# Patient Record
Sex: FEMALE | Race: WHITE | Employment: FULL TIME | URBAN - NONMETROPOLITAN AREA
[De-identification: names, ages, dates, MRNs, and addresses within clinical notes are randomized per-mention and may not be internally consistent; named-entity substitution may affect disease eponyms.]

---
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
## 2019-01-02 NOTE — PROGRESS NOTES
SUBJECTIVE:   Mayra Del Cid is a 18 year old female who presents to clinic today for the following health issues:        Depression and Anxiety Follow-Up    Status since last visit: Improved     Other associated symptoms:None    Complicating factors:     Significant life event: No     Current substance abuse: None    PHQ 2018   PHQ-9 Total Score 16 18 15   Q9: Suicide Ideation Not at all Several days Not at all   F/U: Thoughts of suicide or self-harm - No -   F/U: Safety concerns - No -     VIPUL-7 SCORE 2018   Total Score 15 18 17       PHQ-9  English  PHQ-9   Any Language  VIPUL-7  Suicide Assessment Five-step Evaluation and Treatment (SAFE-T)    Amount of exercise or physical activity: 6-7 days/week for an average of 15-30 minutes    Problems taking medications regularly: No    Medication side effects: none    Diet: regular (no restrictions)            Problem list and histories reviewed & adjusted, as indicated.  Additional history: as documented    Patient Active Problem List   Diagnosis     Generalized headache     Pain in joint, shoulder region     Somatic dysfunction of cervical region     Disorder of bursae and tendons in shoulder region     Sprain and strain of hip and thigh     Pain in joint, pelvic region and thigh     Enthesopathy of hip region     ACP (advance care planning)     Moderate single current episode of major depressive disorder (H)     VIPUL (generalized anxiety disorder)     Past Surgical History:   Procedure Laterality Date     ORTHOPEDIC SURGERY      right labrum surgery        Social History     Tobacco Use     Smoking status: Former Smoker     Packs/day: 1.00     Years: 3.00     Pack years: 3.00     Types: Cigarettes     Start date: 2016     Last attempt to quit: 10/17/2018     Years since quittin.2     Smokeless tobacco: Never Used   Substance Use Topics     Alcohol use: Yes     Comment: occ     Family History   Problem Relation  Age of Onset     Hypertension Mother      Depression Mother      Anxiety Disorder Mother      Asthma Mother      Depression Father      Substance Abuse Father      Diabetes Maternal Grandmother      Heart Disease Maternal Grandmother      Thyroid Disease Maternal Grandmother      Depression Maternal Grandmother      Anxiety Disorder Maternal Grandmother      Asthma Maternal Grandmother      Diabetes Maternal Grandfather      Heart Disease Maternal Grandfather      Chronic Obstructive Pulmonary Disease Paternal Grandmother          Current Outpatient Medications   Medication Sig Dispense Refill     albuterol (PROAIR HFA, PROVENTIL HFA, VENTOLIN HFA) 108 (90 BASE) MCG/ACT inhaler Inhale 2 puffs into the lungs       etonogestrel (IMPLANON/NEXPLANON) 68 MG IMPL 1 each (68 mg) by Subdermal route continuous       IBUPROFEN PO Take 600 mg by mouth every 8 hours as needed for moderate pain       LORazepam (ATIVAN) 0.5 MG tablet Take 1 tablet (0.5 mg) by mouth every 6 hours as needed for anxiety 15 tablet 0     sertraline (ZOLOFT) 50 MG tablet Take 1 tablet (50 mg) by mouth daily 90 tablet 3     No Known Allergies  Recent Labs   Lab Test 07/17/17  1713 07/27/16  0900   TSH 0.91 1.13      BP Readings from Last 3 Encounters:   01/04/19 104/72   12/21/18 116/70 (61 %/ 66 %)*   11/02/18 128/80 (93 %/ 93 %)*     *BP percentiles are based on the August 2017 AAP Clinical Practice Guideline for girls    Wt Readings from Last 3 Encounters:   01/04/19 114.3 kg (252 lb) (>99 %)*   12/21/18 116.6 kg (257 lb) (>99 %)*   11/02/18 106.6 kg (235 lb) (>99 %)*     * Growth percentiles are based on CDC (Girls, 2-20 Years) data.                    Reviewed and updated as needed this visit by clinical staff       Reviewed and updated as needed this visit by Provider         ROS:  Constitutional, neuro, ENT, endocrine, pulmonary, cardiac, gastrointestinal, genitourinary, musculoskeletal, integument and psychiatric systems are negative, except as  otherwise noted.    OBJECTIVE:                                                    /72   Pulse 79   Temp 97.7  F (36.5  C) (Tympanic)   Wt 114.3 kg (252 lb)   SpO2 97%   BMI 39.47 kg/m    Body mass index is 39.47 kg/m .  GENERAL APPEARANCE: healthy, alert and no distress  NEURO: Normal strength and tone, mentation intact and speech normal  PSYCH: mentation appears normal and affect normal/bright    Diagnostic test results:  Diagnostic Test Results:  none      ASSESSMENT/PLAN:                                                    1. Screen for STD (sexually transmitted disease)  She is going to do a urine screening today. Low risk.   - GC/Chlamydia by PCR - [facility]    2. Major depressive disorder, recurrent episode, in partial remission (H)  She is doing much better. PHQ went from 15 to 5 and sleeping has improved.  The switch inially has been quite helpful. We will have her see us back in 3 month and if having any changes can my chart us to make changes.      3. Encounter for screening for HIV  Willing to complete. Very low risk patient.   - HIV Antigen Antibody Combo      See Patient Instructions    SANDER Grace  [facility] [place]

## 2019-01-04 ENCOUNTER — OFFICE VISIT (OUTPATIENT)
Dept: FAMILY MEDICINE | Facility: OTHER | Age: 19
End: 2019-01-04
Attending: PHYSICIAN ASSISTANT
Payer: COMMERCIAL

## 2019-01-04 VITALS
BODY MASS INDEX: 39.47 KG/M2 | DIASTOLIC BLOOD PRESSURE: 72 MMHG | WEIGHT: 252 LBS | SYSTOLIC BLOOD PRESSURE: 104 MMHG | OXYGEN SATURATION: 97 % | TEMPERATURE: 97.7 F | HEART RATE: 79 BPM

## 2019-01-04 DIAGNOSIS — Z11.3 SCREEN FOR STD (SEXUALLY TRANSMITTED DISEASE): Primary | ICD-10-CM

## 2019-01-04 DIAGNOSIS — F33.41 MAJOR DEPRESSIVE DISORDER, RECURRENT EPISODE, IN PARTIAL REMISSION (H): ICD-10-CM

## 2019-01-04 DIAGNOSIS — Z11.4 ENCOUNTER FOR SCREENING FOR HIV: ICD-10-CM

## 2019-01-04 LAB
C TRACH DNA SPEC QL PROBE+SIG AMP: NOT DETECTED
N GONORRHOEA DNA SPEC QL PROBE+SIG AMP: NOT DETECTED
SPECIMEN SOURCE: NORMAL

## 2019-01-04 PROCEDURE — 87389 HIV-1 AG W/HIV-1&-2 AB AG IA: CPT | Mod: 90 | Performed by: PHYSICIAN ASSISTANT

## 2019-01-04 PROCEDURE — 99213 OFFICE O/P EST LOW 20 MIN: CPT | Performed by: PHYSICIAN ASSISTANT

## 2019-01-04 PROCEDURE — 36415 COLL VENOUS BLD VENIPUNCTURE: CPT | Performed by: PHYSICIAN ASSISTANT

## 2019-01-04 PROCEDURE — 87491 CHLMYD TRACH DNA AMP PROBE: CPT | Performed by: PHYSICIAN ASSISTANT

## 2019-01-04 PROCEDURE — 87591 N.GONORRHOEAE DNA AMP PROB: CPT | Performed by: PHYSICIAN ASSISTANT

## 2019-01-04 PROCEDURE — 99000 SPECIMEN HANDLING OFFICE-LAB: CPT | Performed by: PHYSICIAN ASSISTANT

## 2019-01-04 ASSESSMENT — PAIN SCALES - GENERAL: PAINLEVEL: NO PAIN (0)

## 2019-01-04 ASSESSMENT — ANXIETY QUESTIONNAIRES
4. TROUBLE RELAXING: MORE THAN HALF THE DAYS
5. BEING SO RESTLESS THAT IT IS HARD TO SIT STILL: SEVERAL DAYS
6. BECOMING EASILY ANNOYED OR IRRITABLE: NEARLY EVERY DAY
2. NOT BEING ABLE TO STOP OR CONTROL WORRYING: SEVERAL DAYS
GAD7 TOTAL SCORE: 11
3. WORRYING TOO MUCH ABOUT DIFFERENT THINGS: SEVERAL DAYS
7. FEELING AFRAID AS IF SOMETHING AWFUL MIGHT HAPPEN: SEVERAL DAYS
1. FEELING NERVOUS, ANXIOUS, OR ON EDGE: MORE THAN HALF THE DAYS

## 2019-01-04 ASSESSMENT — PATIENT HEALTH QUESTIONNAIRE - PHQ9: SUM OF ALL RESPONSES TO PHQ QUESTIONS 1-9: 5

## 2019-01-04 NOTE — PATIENT INSTRUCTIONS
Thank you for choosing [facility].   I have office hours 8:00 am to 4:30 pm on Monday's, Wednesday's, Thursday's and Friday's. My nurse and I are out of the office every Tuesday.    Following your visit, when your labs and diagnostic testing have returned, I will review then and you will be contacted by my nurse.  If you are on My Chart, you can also view results there.    For refills, notify your pharmacy regarding what you need and the pharmacy will generate a refill request. Do not call my nurse as she is unable to process refill request. Please plan ahead and allow 3-5 days for refill requests.    You will generally receive a reminder call the day prior to your appointment.  If you cannot attend your appointment, please cancel your appointment with as much notice as possible.  If there is a pattern of failure to present for your appointments, I cannot provide consistent, meaningful, ongoing care for you. It is very important to me that you come in for your care, so we can best assist you with your health care needs.    IMPORTANT:  Please note that it is my standard of practice to NOT participate in prescribing ongoing requested Narcotic Analgesic therapy, and/or participate in the prescribing of other controlled substances.  My nurse and I am happy to assist you with the process of referral for alternative pain management as needed, and other treatment modalities including but not limited to:  Physical Therapy, Physical Medicine and Rehab, Counseling, Chiropractic Care, Orthopedic Care, and non-narcotic medication management.     In the event that you need to be seen for emergent concerns and I am out of office,  please see one of my colleagues for acute concerns.  You may also present to  or ER.  I appreciate the opportunity to serve you and look forward to supporting your healthcare needs in the future. Please contact me with any questions or concerns that you may  have.    Sincerely,      Ceci Bernal RN, PA-C

## 2019-01-04 NOTE — NURSING NOTE
"Chief Complaint   Patient presents with     Recheck Medication       Initial /72   Pulse 79   Temp 97.7  F (36.5  C) (Tympanic)   Wt 114.3 kg (252 lb)   SpO2 97%   BMI 39.47 kg/m   Estimated body mass index is 39.47 kg/m  as calculated from the following:    Height as of 11/2/18: 1.702 m (5' 7\").    Weight as of this encounter: 114.3 kg (252 lb).  Medication Reconciliation: complete    Nedra Redmond LPN  "

## 2019-01-05 ASSESSMENT — ANXIETY QUESTIONNAIRES: GAD7 TOTAL SCORE: 11

## 2019-01-07 LAB — HIV 1+2 AB+HIV1 P24 AG SERPL QL IA: NONREACTIVE

## 2019-01-15 ENCOUNTER — MYC MEDICAL ADVICE (OUTPATIENT)
Dept: FAMILY MEDICINE | Facility: OTHER | Age: 19
End: 2019-01-15

## 2019-01-15 DIAGNOSIS — F41.0 PANIC ATTACK: Primary | ICD-10-CM

## 2019-01-16 RX ORDER — HYDROXYZINE HYDROCHLORIDE 25 MG/1
25 TABLET, FILM COATED ORAL 3 TIMES DAILY PRN
Qty: 30 TABLET | Refills: 1 | Status: SHIPPED | OUTPATIENT
Start: 2019-01-16 | End: 2019-04-05

## 2019-03-22 ENCOUNTER — MYC MEDICAL ADVICE (OUTPATIENT)
Dept: FAMILY MEDICINE | Facility: OTHER | Age: 19
End: 2019-03-22

## 2019-03-22 DIAGNOSIS — Z30.430 ENCOUNTER FOR IUD INSERTION: Primary | ICD-10-CM

## 2019-03-22 DIAGNOSIS — F41.1 GAD (GENERALIZED ANXIETY DISORDER): ICD-10-CM

## 2019-03-29 NOTE — PROGRESS NOTES
SUBJECTIVE:   Mayra Del Cid is a 18 year old female who presents to clinic today for the following health issues:      Depression and Anxiety Follow-Up    Status since last visit: Depression improved, feels less sad. Anxiety has no change, still feel as though heart is beating out of chest    Other associated symptoms:None    Complicating factors:     Significant life event: No     Current substance abuse: None    PHQ 2018   PHQ-9 Total Score 18 15 5   Q9: Suicide Ideation Several days Not at all Not at all   F/U: Thoughts of suicide or self-harm No - -   F/U: Safety concerns No - -     VIPUL-7 SCORE 2018   Total Score 18 17 11       PHQ-9  English  PHQ-9   Any Language  VIPUL-7  Suicide Assessment Five-step Evaluation and Treatment (SAFE-T)    Amount of exercise or physical activity: 4-5 days/week for an average of 45-60 minutes    Problems taking medications regularly: No    Medication side effects: none    Diet: regular (no restrictions)            Problem list and histories reviewed & adjusted, as indicated.  Additional history: as documented    Patient Active Problem List   Diagnosis     Generalized headache     Pain in joint, shoulder region     Somatic dysfunction of cervical region     Disorder of bursae and tendons in shoulder region     Sprain and strain of hip and thigh     Pain in joint, pelvic region and thigh     Enthesopathy of hip region     ACP (advance care planning)     Moderate single current episode of major depressive disorder (H)     VIPUL (generalized anxiety disorder)     Past Surgical History:   Procedure Laterality Date     ORTHOPEDIC SURGERY      right labrum surgery        Social History     Tobacco Use     Smoking status: Former Smoker     Packs/day: 1.00     Years: 3.00     Pack years: 3.00     Types: Cigarettes     Start date: 2016     Last attempt to quit: 10/17/2018     Years since quittin.4     Smokeless tobacco: Never Used    Substance Use Topics     Alcohol use: Yes     Comment: occ     Family History   Problem Relation Age of Onset     Hypertension Mother      Depression Mother      Anxiety Disorder Mother      Asthma Mother      Depression Father      Substance Abuse Father      Diabetes Maternal Grandmother      Heart Disease Maternal Grandmother      Thyroid Disease Maternal Grandmother      Depression Maternal Grandmother      Anxiety Disorder Maternal Grandmother      Asthma Maternal Grandmother      Diabetes Maternal Grandfather      Heart Disease Maternal Grandfather      Chronic Obstructive Pulmonary Disease Paternal Grandmother          Current Outpatient Medications   Medication Sig Dispense Refill     albuterol (PROAIR HFA, PROVENTIL HFA, VENTOLIN HFA) 108 (90 BASE) MCG/ACT inhaler Inhale 2 puffs into the lungs       etonogestrel (IMPLANON/NEXPLANON) 68 MG IMPL 1 each (68 mg) by Subdermal route continuous       IBUPROFEN PO Take 600 mg by mouth every 8 hours as needed for moderate pain       LORazepam (ATIVAN) 0.5 MG tablet Take 1 tablet (0.5 mg) by mouth every 6 hours as needed for anxiety 15 tablet 0     metoprolol tartrate (LOPRESSOR) 25 MG tablet Take 1 tablet (25 mg) by mouth 2 times daily 60 tablet 1     sertraline (ZOLOFT) 100 MG tablet Take 1 tablet (100 mg) by mouth daily 90 tablet 1     sertraline (ZOLOFT) 50 MG tablet Take 1.5 tablets (75 mg) by mouth daily 135 tablet 3     No Known Allergies  Recent Labs   Lab Test 07/17/17  1713 07/27/16  0900   TSH 0.91 1.13      BP Readings from Last 3 Encounters:   04/05/19 120/84   01/04/19 104/72   12/21/18 116/70    Wt Readings from Last 3 Encounters:   04/05/19 117.9 kg (260 lb) (>99 %)*   01/04/19 114.3 kg (252 lb) (>99 %)*   12/21/18 116.6 kg (257 lb) (>99 %)*     * Growth percentiles are based on CDC (Girls, 2-20 Years) data.                    Reviewed and updated as needed this visit by clinical staff       Reviewed and updated as needed this visit by Provider      "    ROS:  Constitutional, HEENT, cardiovascular, pulmonary, gi and gu systems are negative, except as otherwise noted.    OBJECTIVE:                                                    /84   Pulse 80   Temp 98.6  F (37  C) (Tympanic)   Ht 1.702 m (5' 7\")   Wt 117.9 kg (260 lb)   SpO2 99%   BMI 40.72 kg/m    Body mass index is 40.72 kg/m .  GENERAL APPEARANCE: healthy, alert and no distress  CV: regular rates and rhythm, normal S1 S2, no S3 or S4 and no murmur, click or rub  SKIN: no suspicious lesions or rashes  PSYCH: mentation appears normal and affect normal/bright  MENTAL STATUS EXAM:  Appearance/Behavior: No apparent distress and Appears stated age  Speech: Normal  Mood/Affect: normal affect  Insight: Adequate    Diagnostic test results:  Diagnostic Test Results:  No results found for this or any previous visit (from the past 24 hour(s)).     ASSESSMENT/PLAN:                                                    1. VIPUL (generalized anxiety disorder)  She has more anxiety and her depression is much better.  Panic still an issue. Still has her Ativan. Saving it incase she needs this. She is always moving feels sedation of hte Zoloft but able to still function well. We will see her back in 4 weeks.  Has palpations and and this brings on panic so see if lopressor will help this.   - metoprolol tartrate (LOPRESSOR) 25 MG tablet; Take 1 tablet (25 mg) by mouth 2 times daily  Dispense: 60 tablet; Refill: 1  - sertraline (ZOLOFT) 100 MG tablet; Take 1 tablet (100 mg) by mouth daily  Dispense: 90 tablet; Refill: 1      See Patient Instructions    Ceci Bernal, PA  [facility] - [place]  "

## 2019-04-05 ENCOUNTER — OFFICE VISIT (OUTPATIENT)
Dept: OBGYN | Facility: OTHER | Age: 19
End: 2019-04-05
Attending: PHYSICIAN ASSISTANT
Payer: COMMERCIAL

## 2019-04-05 ENCOUNTER — OFFICE VISIT (OUTPATIENT)
Dept: FAMILY MEDICINE | Facility: OTHER | Age: 19
End: 2019-04-05
Attending: PHYSICIAN ASSISTANT
Payer: COMMERCIAL

## 2019-04-05 ENCOUNTER — TELEPHONE (OUTPATIENT)
Dept: FAMILY MEDICINE | Facility: OTHER | Age: 19
End: 2019-04-05

## 2019-04-05 VITALS
HEIGHT: 67 IN | HEART RATE: 89 BPM | SYSTOLIC BLOOD PRESSURE: 106 MMHG | OXYGEN SATURATION: 99 % | WEIGHT: 260 LBS | BODY MASS INDEX: 40.81 KG/M2 | DIASTOLIC BLOOD PRESSURE: 74 MMHG

## 2019-04-05 VITALS
BODY MASS INDEX: 40.81 KG/M2 | TEMPERATURE: 98.6 F | WEIGHT: 260 LBS | HEIGHT: 67 IN | DIASTOLIC BLOOD PRESSURE: 84 MMHG | OXYGEN SATURATION: 99 % | SYSTOLIC BLOOD PRESSURE: 120 MMHG | HEART RATE: 80 BPM

## 2019-04-05 DIAGNOSIS — Z11.3 SCREEN FOR STD (SEXUALLY TRANSMITTED DISEASE): Primary | ICD-10-CM

## 2019-04-05 DIAGNOSIS — Z30.09 ENCOUNTER FOR COUNSELING REGARDING CONTRACEPTION: ICD-10-CM

## 2019-04-05 DIAGNOSIS — F41.1 GAD (GENERALIZED ANXIETY DISORDER): ICD-10-CM

## 2019-04-05 LAB
SPECIMEN SOURCE: NORMAL
WET PREP SPEC: NORMAL

## 2019-04-05 PROCEDURE — 87491 CHLMYD TRACH DNA AMP PROBE: CPT | Performed by: NURSE PRACTITIONER

## 2019-04-05 PROCEDURE — 99213 OFFICE O/P EST LOW 20 MIN: CPT | Performed by: NURSE PRACTITIONER

## 2019-04-05 PROCEDURE — 87210 SMEAR WET MOUNT SALINE/INK: CPT | Performed by: NURSE PRACTITIONER

## 2019-04-05 PROCEDURE — 99213 OFFICE O/P EST LOW 20 MIN: CPT | Performed by: PHYSICIAN ASSISTANT

## 2019-04-05 PROCEDURE — 87591 N.GONORRHOEAE DNA AMP PROB: CPT | Performed by: NURSE PRACTITIONER

## 2019-04-05 RX ORDER — SERTRALINE HYDROCHLORIDE 100 MG/1
100 TABLET, FILM COATED ORAL DAILY
Qty: 90 TABLET | Refills: 1 | Status: SHIPPED | OUTPATIENT
Start: 2019-04-05 | End: 2019-07-26

## 2019-04-05 RX ORDER — METOPROLOL TARTRATE 25 MG/1
25 TABLET, FILM COATED ORAL 2 TIMES DAILY
Qty: 60 TABLET | Refills: 1 | Status: SHIPPED | OUTPATIENT
Start: 2019-04-05 | End: 2019-05-06

## 2019-04-05 ASSESSMENT — PATIENT HEALTH QUESTIONNAIRE - PHQ9
SUM OF ALL RESPONSES TO PHQ QUESTIONS 1-9: 6
5. POOR APPETITE OR OVEREATING: NEARLY EVERY DAY

## 2019-04-05 ASSESSMENT — MIFFLIN-ST. JEOR
SCORE: 1986.98
SCORE: 1986.98

## 2019-04-05 ASSESSMENT — ANXIETY QUESTIONNAIRES
7. FEELING AFRAID AS IF SOMETHING AWFUL MIGHT HAPPEN: SEVERAL DAYS
3. WORRYING TOO MUCH ABOUT DIFFERENT THINGS: MORE THAN HALF THE DAYS
GAD7 TOTAL SCORE: 15
5. BEING SO RESTLESS THAT IT IS HARD TO SIT STILL: SEVERAL DAYS
2. NOT BEING ABLE TO STOP OR CONTROL WORRYING: MORE THAN HALF THE DAYS
1. FEELING NERVOUS, ANXIOUS, OR ON EDGE: NEARLY EVERY DAY
6. BECOMING EASILY ANNOYED OR IRRITABLE: NEARLY EVERY DAY

## 2019-04-05 ASSESSMENT — PAIN SCALES - GENERAL
PAINLEVEL: NO PAIN (0)
PAINLEVEL: NO PAIN (0)

## 2019-04-05 NOTE — PROGRESS NOTES
"[facility]                HPI   Mayra presents today to discuss possible placement of a Paragard IUD.  She currently has a Nexplanon in place and it is due to be replaced in about 6 months. She has not been having periods until last week when she had a moderate flow for 1 week.  We have discussed all contraceptive options and have discussed the IUD's at length.  She would like to proceed with screenings today and have an IUD placed and Nexplanon removal next week.              Medications:     Current Outpatient Medications Ordered in Epic   Medication     albuterol (PROAIR HFA, PROVENTIL HFA, VENTOLIN HFA) 108 (90 BASE) MCG/ACT inhaler     etonogestrel (IMPLANON/NEXPLANON) 68 MG IMPL     IBUPROFEN PO     LORazepam (ATIVAN) 0.5 MG tablet     metoprolol tartrate (LOPRESSOR) 25 MG tablet     sertraline (ZOLOFT) 100 MG tablet     No current Epic-ordered facility-administered medications on file.                 Allergies:   Patient has no known allergies.         Review of Systems:   The 5 point Review of Systems is negative other than noted in the HPI                     Physical Exam:   Blood pressure 106/74, pulse 89, height 1.702 m (5' 7\"), weight 117.9 kg (260 lb), SpO2 99 %, not currently breastfeeding.  Constitutional:   awake, alert, cooperative, no apparent distress, and appears stated age     Genitounirinary:   External Genitalia:  General appearance; normal  Vagina:  Discharge absent, Lesions absent  Cervix:  General appearance normal, Tenderness absent              Data:   pending           Assessment and Plan:   Contraceptive counseling - as above.  Placement scheduled.  STD screening - wet prep and G/C completed.      ANGEL Ulloa  4/5/2019  2:04 PM  "

## 2019-04-05 NOTE — TELEPHONE ENCOUNTER
[facility] called to confirm Zoloft dose.      below copied from Henrry's note  - sertraline (ZOLOFT) 100 MG tablet; Take 1 tablet (100 mg) by mouth daily  Dispense: 90 tablet; Refill: 1       Pharmacy updated.

## 2019-04-05 NOTE — PATIENT INSTRUCTIONS
Thank you for choosing [facility].   I have office hours 8:00 am to 4:30 pm on Monday's, Wednesday's, Thursday's and Friday's. My nurse and I are out of the office every Tuesday.    Following your visit, when your labs and diagnostic testing have returned, I will review then and you will be contacted by my nurse.  If you are on My Chart, you can also view results there.    For refills, notify your pharmacy regarding what you need and the pharmacy will generate a refill request. Do not call my nurse as she is unable to process refill request. Please plan ahead and allow 3-5 days for refill requests.    You will generally receive a reminder call the day prior to your appointment.  If you cannot attend your appointment, please cancel your appointment with as much notice as possible.  If there is a pattern of failure to present for your appointments, I cannot provide consistent, meaningful, ongoing care for you. It is very important to me that you come in for your care, so we can best assist you with your health care needs.    IMPORTANT:  Please note that it is my standard of practice to NOT participate in prescribing ongoing requested Narcotic Analgesic therapy, and/or participate in the prescribing of other controlled substances.  My nurse and I am happy to assist you with the process of referral for alternative pain management as needed, and other treatment modalities including but not limited to:  Physical Therapy, Physical Medicine and Rehab, Counseling, Chiropractic Care, Orthopedic Care, and non-narcotic medication management.     In the event that you need to be seen for emergent concerns and I am out of office,  please see one of my colleagues for acute concerns.  You may also present to  or ER.  I appreciate the opportunity to serve you and look forward to supporting your healthcare needs in the future. Please contact me with any questions or concerns that you may  have.    Sincerely,      Ceci Bernal RN, PA-C

## 2019-04-05 NOTE — NURSING NOTE
"Chief Complaint   Patient presents with     IUD       Initial /74 (BP Location: Left arm, Patient Position: Sitting, Cuff Size: Adult Large)   Pulse 89   Ht 1.702 m (5' 7\")   Wt 117.9 kg (260 lb)   SpO2 99%   BMI 40.72 kg/m   Estimated body mass index is 40.72 kg/m  as calculated from the following:    Height as of this encounter: 1.702 m (5' 7\").    Weight as of this encounter: 117.9 kg (260 lb).  Medication Reconciliation: complete    Francia Becker LPN    "

## 2019-04-05 NOTE — NURSING NOTE
"No chief complaint on file.      Initial /84   Pulse 80   Temp 98.6  F (37  C) (Tympanic)   Ht 1.702 m (5' 7\")   Wt 117.9 kg (260 lb)   SpO2 99%   BMI 40.72 kg/m   Estimated body mass index is 40.72 kg/m  as calculated from the following:    Height as of this encounter: 1.702 m (5' 7\").    Weight as of this encounter: 117.9 kg (260 lb).  Medication Reconciliation: complete    Shruti ePrla LPN    "

## 2019-04-06 ASSESSMENT — ANXIETY QUESTIONNAIRES: GAD7 TOTAL SCORE: 15

## 2019-04-09 ENCOUNTER — MYC REFILL (OUTPATIENT)
Dept: FAMILY MEDICINE | Facility: OTHER | Age: 19
End: 2019-04-09

## 2019-04-09 DIAGNOSIS — F41.1 GAD (GENERALIZED ANXIETY DISORDER): ICD-10-CM

## 2019-04-09 NOTE — TELEPHONE ENCOUNTER
LORazepam (ATIVAN) 0.5 MG tablet      Last Written Prescription Date:  12/21/18  Last Fill Quantity: 15,   # refills: 0  Last Office Visit: 4/5/19  Future Office visit:    Next 5 appointments (look out 90 days)    Apr 11, 2019 11:15 AM CDT  (Arrive by 11:00 AM)  [facility]l [facility] [address] [address]  [phone]   May 06, 2019 10:20 AM CDT  (Arrive by 10:05 AM)  [facility] with SANDER Beltran  [facility] [address]  [facility] [address]  [phone]           Routing refill request to provider for review/approval because:  Drug not on the [facility], [facility] or  Health refill protocol or controlled substance    PCP Gill. Please advise. Thank you!

## 2019-04-10 RX ORDER — LORAZEPAM 0.5 MG/1
0.5 TABLET ORAL EVERY 6 HOURS PRN
Qty: 15 TABLET | Refills: 0 | Status: SHIPPED | OUTPATIENT
Start: 2019-04-10 | End: 2020-02-25

## 2019-04-11 ENCOUNTER — OFFICE VISIT (OUTPATIENT)
Dept: OBGYN | Facility: OTHER | Age: 19
End: 2019-04-11
Attending: NURSE PRACTITIONER
Payer: COMMERCIAL

## 2019-04-11 VITALS
HEART RATE: 76 BPM | OXYGEN SATURATION: 99 % | SYSTOLIC BLOOD PRESSURE: 108 MMHG | HEIGHT: 67 IN | BODY MASS INDEX: 40.81 KG/M2 | DIASTOLIC BLOOD PRESSURE: 72 MMHG | WEIGHT: 260 LBS

## 2019-04-11 DIAGNOSIS — Z30.46 ENCOUNTER FOR NEXPLANON REMOVAL: Primary | ICD-10-CM

## 2019-04-11 DIAGNOSIS — Z30.430 ENCOUNTER FOR IUD INSERTION: ICD-10-CM

## 2019-04-11 PROCEDURE — 11982 REMOVE DRUG IMPLANT DEVICE: CPT | Performed by: NURSE PRACTITIONER

## 2019-04-11 PROCEDURE — 58300 INSERT INTRAUTERINE DEVICE: CPT | Performed by: NURSE PRACTITIONER

## 2019-04-11 RX ORDER — COPPER 313.4 MG/1
1 INTRAUTERINE DEVICE INTRAUTERINE CONTINUOUS
Status: ACTIVE
Start: 2019-04-11

## 2019-04-11 ASSESSMENT — PAIN SCALES - GENERAL: PAINLEVEL: NO PAIN (0)

## 2019-04-11 ASSESSMENT — MIFFLIN-ST. JEOR: SCORE: 1986.98

## 2019-04-11 NOTE — PROGRESS NOTES
"CC:  IUD insertion and Nexplanon removal  HPI:  Mayra eDl Cid is a 18 year old female No LMP recorded. Patient has had an implant.  She is here for an IUD insertion. Patient has verbalized understanding of risks and benefits and has signed the consent form.    Past GYN history:  No STD history    Reviewed with patient in office    Allergies: Patient has no known allergies.    Current Outpatient Medications   Medication Sig Dispense Refill     albuterol (PROAIR HFA, PROVENTIL HFA, VENTOLIN HFA) 108 (90 BASE) MCG/ACT inhaler Inhale 2 puffs into the lungs       etonogestrel (IMPLANON/NEXPLANON) 68 MG IMPL 1 each (68 mg) by Subdermal route continuous       IBUPROFEN PO Take 600 mg by mouth every 8 hours as needed for moderate pain       LORazepam (ATIVAN) 0.5 MG tablet Take 1 tablet (0.5 mg) by mouth every 6 hours as needed for anxiety 15 tablet 0     metoprolol tartrate (LOPRESSOR) 25 MG tablet Take 1 tablet (25 mg) by mouth 2 times daily 60 tablet 1     sertraline (ZOLOFT) 100 MG tablet Take 1 tablet (100 mg) by mouth daily 90 tablet 1         ROS:  CONSTITUTIONAL:NEGATIVE for fever, chills, change in weight  : normal menstrual cycles, negative for and sexually transmitted disease      EXAM:  Blood pressure 108/72, pulse 76, height 1.702 m (5' 7\"), weight 117.9 kg (260 lb), SpO2 99 %, not currently breastfeeding.  General - pleasant female in no acute distress.  Pelvic - EG: normal adult female, BUS: within normal limits, Vagina: well rugated, no discharge, Cervix: no lesions or CMT, Uterus: firm, normal sized and nontender, Adnexae: no masses or tenderness.    PROCEDURE:  After informed consent was obtained from the patient, a speculum was placed in the vagina to visualized the cervix.  The cervix was then swabbed with a betadine prep.  Tenaculum was placed at the 12 o'clock position on the cervix and the uterus sounded to 8 cm.  The Paragard T-380  IUD was then placed in the usual fashion under sterile technique. "  Strings were clipped about 2-3 cm from the cervical os.  Tenaculum was removed and cervix was hemostatic. There were no complications. The patient tolerated the procedure well.    Procedure:  After obtaining consent from the patient the implanon was removed.  Her arm was prepped copiously with betadine and 1% lidocaine ~ 1 cc was injected into her arm over the implanon piter.  A 0.8 cm incision was made over the previous scar and the implanon piter was removed without difficulty. Pressure was placed over the incision and minimal bleeding was noted.  It was covered with 1/2 inch steri strips and the arm was bandaged with a kerlex guaze.  She will leave that on for at least 24 hrs.    She tolerated the procedure well.  No complications.     ASSESSMENT/PLAN:  (Z30.46) Encounter for Nexplanon removal  (primary encounter diagnosis)  Comment:   Plan: removed without incident.      (Z30.430) Encounter for IUD insertion  Comment:   Plan: INSERTION INTRAUTERINE DEVICE, paragard         intrauterine copper IUD device 1 each            Bleeding pattern of this particular IUD was discussed with the patient. She is aware that the IUD will need to be removed in 10 years or PRN.  She is to return to clinic for IUD check  In 6 weeks.    ANGEL Ulloa

## 2019-04-11 NOTE — NURSING NOTE
"Chief Complaint   Patient presents with     Contraception     Nexplanon Removal and IUD Placement        Initial /72 (BP Location: Right arm, Patient Position: Sitting, Cuff Size: Adult Large)   Pulse 76   Ht 1.702 m (5' 7\")   Wt 117.9 kg (260 lb)   SpO2 99%   BMI 40.72 kg/m   Estimated body mass index is 40.72 kg/m  as calculated from the following:    Height as of this encounter: 1.702 m (5' 7\").    Weight as of this encounter: 117.9 kg (260 lb).  Medication Reconciliation: complete    Francia Becker LPN    "

## 2019-05-03 NOTE — PROGRESS NOTES
SUBJECTIVE:   Mayra Del Cid is a 18 year old female who presents to clinic today for the following   health issues:      Depression and Anxiety Follow-Up    Status since last visit: depression is stable and anxiety has gotten better    Other associated symptoms:None    Complicating factors:     Significant life event: No     Current substance abuse: None    PHQ 1/4/2019 4/5/2019 5/6/2019   PHQ-9 Total Score 5 6 5   Q9: Thoughts of better off dead/self-harm past 2 weeks Not at all Not at all Not at all   F/U: Thoughts of suicide or self-harm - - -   F/U: Safety concerns - - -     VIPUL-7 SCORE 1/4/2019 4/5/2019 5/6/2019   Total Score 11 15 6       PHQ-9  English  PHQ-9   Any Language  VIPUL-7  Suicide Assessment Five-step Evaluation and Treatment (SAFE-T)    Amount of exercise or physical activity: 4-5 days/week for an average of 45-60 minutes    Problems taking medications regularly: No    Medication side effects: feels like she can not sleep and no sex drive     Diet: regular (no restrictions)            Additional history: as documented    Reviewed  and updated as needed this visit by clinical staff  Tobacco  Allergies  Meds  Med Hx  Surg Hx  Fam Hx  Soc Hx        Reviewed and updated as needed this visit by Provider         Patient Active Problem List   Diagnosis     Generalized headache     Pain in joint, shoulder region     Somatic dysfunction of cervical region     Disorder of bursae and tendons in shoulder region     Sprain and strain of hip and thigh     Pain in joint, pelvic region and thigh     Enthesopathy of hip region     ACP (advance care planning)     Moderate single current episode of major depressive disorder (H)     VIPUL (generalized anxiety disorder)     Past Surgical History:   Procedure Laterality Date     ORTHOPEDIC SURGERY      right labrum surgery        Social History     Tobacco Use     Smoking status: Former Smoker     Packs/day: 1.00     Years: 3.00     Pack years: 3.00     Types:  Cigarettes     Start date: 2016     Last attempt to quit: 10/17/2018     Years since quittin.5     Smokeless tobacco: Never Used   Substance Use Topics     Alcohol use: Yes     Comment: occ     Family History   Problem Relation Age of Onset     Hypertension Mother      Depression Mother      Anxiety Disorder Mother      Asthma Mother      Depression Father      Substance Abuse Father      Diabetes Maternal Grandmother      Heart Disease Maternal Grandmother      Thyroid Disease Maternal Grandmother      Depression Maternal Grandmother      Anxiety Disorder Maternal Grandmother      Asthma Maternal Grandmother      Diabetes Maternal Grandfather      Heart Disease Maternal Grandfather      Chronic Obstructive Pulmonary Disease Paternal Grandmother          Current Outpatient Medications   Medication Sig Dispense Refill     albuterol (PROAIR HFA, PROVENTIL HFA, VENTOLIN HFA) 108 (90 BASE) MCG/ACT inhaler Inhale 2 puffs into the lungs       buPROPion (WELLBUTRIN XL) 300 MG 24 hr tablet Take 1 tablet (300 mg) by mouth every morning 90 tablet 1     IBUPROFEN PO Take 600 mg by mouth every 8 hours as needed for moderate pain       LORazepam (ATIVAN) 0.5 MG tablet Take 1 tablet (0.5 mg) by mouth every 6 hours as needed for anxiety 15 tablet 0     metoprolol tartrate (LOPRESSOR) 25 MG tablet Take 1 tablet (25 mg) by mouth 2 times daily 180 tablet 1     sertraline (ZOLOFT) 100 MG tablet Take 1 tablet (100 mg) by mouth daily 90 tablet 1     No Known Allergies  Recent Labs   Lab Test 17  1713 16  0900   TSH 0.91 1.13      BP Readings from Last 3 Encounters:   19 122/70   19 108/72   19 106/74    Wt Readings from Last 3 Encounters:   19 121.6 kg (268 lb) (>99 %)*   19 117.9 kg (260 lb) (>99 %)*   19 117.9 kg (260 lb) (>99 %)*     * Growth percentiles are based on CDC (Girls, 2-20 Years) data.                    ROS:  Constitutional, neuro, ENT, endocrine, pulmonary,  cardiac, gastrointestinal, genitourinary, musculoskeletal, integument and psychiatric systems are negative, except as otherwise noted.    OBJECTIVE:                                                    /70   Pulse 67   Temp 97.7  F (36.5  C) (Tympanic)   Wt 121.6 kg (268 lb)   SpO2 98%   BMI 41.97 kg/m    Body mass index is 41.97 kg/m .  GENERAL APPEARANCE: healthy, alert and no distress  EYES: Eyes grossly normal to inspection, PERRL and conjunctivae and sclerae normal  HENT: ear canals and TM's normal and nose and mouth without ulcers or lesions  NECK: no adenopathy, no asymmetry, masses, or scars and thyroid normal to palpation  RESP: lungs clear to auscultation - no rales, rhonchi or wheezes  CV: regular rates and rhythm, normal S1 S2, no S3 or S4 and no murmur, click or rub  SKIN: no suspicious lesions or rashes  NEURO: Normal strength and tone, mentation intact and speech normal  PSYCH: mentation appears normal, affect normal/bright.  Metoprolol  helps physical sx of anxiety with palpitations and increased heart rate. Headaches still present on and off but not as severe an more tolerable from stress.     Diagnostic test results:  Diagnostic Test Results:  none      ASSESSMENT/PLAN:                                                    1. VIPUL (generalized anxiety disorder)  She is going to be given Wellbutrin and see is this helps.  No energy as not sleeping and loss of sex drive with increase in dose. We frankie cut the zoloft back to 50 mg and then 25 mg and hold on that dose.   - buPROPion (WELLBUTRIN XL) 300 MG 24 hr tablet; Take 1 tablet (300 mg) by mouth every morning  Dispense: 90 tablet; Refill: 1  - metoprolol tartrate (LOPRESSOR) 25 MG tablet; Take 1 tablet (25 mg) by mouth 2 times daily  Dispense: 180 tablet; Refill: 1    2. Moderate single current episode of major depressive disorder (H)  Take early AM will help her energy. Sleep is not great.  Try Melatonin and or benadyl. Can also try a low dose  of ativan if hasn't slept for day.   - buPROPion (WELLBUTRIN XL) 300 MG 24 hr tablet; Take 1 tablet (300 mg) by mouth every morning  Dispense: 90 tablet; Refill: 1      See Patient Instructions    SANDER Grace  [facility] - [place]

## 2019-05-06 ENCOUNTER — OFFICE VISIT (OUTPATIENT)
Dept: FAMILY MEDICINE | Facility: OTHER | Age: 19
End: 2019-05-06
Attending: PHYSICIAN ASSISTANT
Payer: COMMERCIAL

## 2019-05-06 VITALS
WEIGHT: 268 LBS | BODY MASS INDEX: 41.97 KG/M2 | OXYGEN SATURATION: 98 % | HEART RATE: 67 BPM | DIASTOLIC BLOOD PRESSURE: 70 MMHG | TEMPERATURE: 97.7 F | SYSTOLIC BLOOD PRESSURE: 122 MMHG

## 2019-05-06 DIAGNOSIS — F32.1 MODERATE SINGLE CURRENT EPISODE OF MAJOR DEPRESSIVE DISORDER (H): ICD-10-CM

## 2019-05-06 DIAGNOSIS — F41.1 GAD (GENERALIZED ANXIETY DISORDER): Primary | ICD-10-CM

## 2019-05-06 PROCEDURE — 99213 OFFICE O/P EST LOW 20 MIN: CPT | Performed by: PHYSICIAN ASSISTANT

## 2019-05-06 RX ORDER — METOPROLOL TARTRATE 25 MG/1
25 TABLET, FILM COATED ORAL 2 TIMES DAILY
Qty: 180 TABLET | Refills: 1 | Status: SHIPPED | OUTPATIENT
Start: 2019-05-06 | End: 2019-10-28

## 2019-05-06 RX ORDER — BUPROPION HYDROCHLORIDE 300 MG/1
300 TABLET ORAL EVERY MORNING
Qty: 90 TABLET | Refills: 1 | Status: SHIPPED | OUTPATIENT
Start: 2019-05-06 | End: 2019-07-26

## 2019-05-06 ASSESSMENT — ANXIETY QUESTIONNAIRES
GAD7 TOTAL SCORE: 6
4. TROUBLE RELAXING: SEVERAL DAYS
3. WORRYING TOO MUCH ABOUT DIFFERENT THINGS: SEVERAL DAYS
1. FEELING NERVOUS, ANXIOUS, OR ON EDGE: SEVERAL DAYS
2. NOT BEING ABLE TO STOP OR CONTROL WORRYING: SEVERAL DAYS
6. BECOMING EASILY ANNOYED OR IRRITABLE: MORE THAN HALF THE DAYS
5. BEING SO RESTLESS THAT IT IS HARD TO SIT STILL: NOT AT ALL
7. FEELING AFRAID AS IF SOMETHING AWFUL MIGHT HAPPEN: NOT AT ALL

## 2019-05-06 ASSESSMENT — PATIENT HEALTH QUESTIONNAIRE - PHQ9: SUM OF ALL RESPONSES TO PHQ QUESTIONS 1-9: 5

## 2019-05-06 ASSESSMENT — PAIN SCALES - GENERAL: PAINLEVEL: NO PAIN (0)

## 2019-05-06 NOTE — NURSING NOTE
"Chief Complaint   Patient presents with     Depression     Anxiety       Initial /70   Pulse 67   Temp 97.7  F (36.5  C) (Tympanic)   Wt 121.6 kg (268 lb)   SpO2 98%   BMI 41.97 kg/m   Estimated body mass index is 41.97 kg/m  as calculated from the following:    Height as of 4/11/19: 1.702 m (5' 7\").    Weight as of this encounter: 121.6 kg (268 lb).  Medication Reconciliation: complete    Nedra Redmond LPN    "

## 2019-05-07 ASSESSMENT — ANXIETY QUESTIONNAIRES: GAD7 TOTAL SCORE: 6

## 2019-05-24 ENCOUNTER — OFFICE VISIT (OUTPATIENT)
Dept: OBGYN | Facility: OTHER | Age: 19
End: 2019-05-24
Attending: NURSE PRACTITIONER
Payer: COMMERCIAL

## 2019-05-24 VITALS
DIASTOLIC BLOOD PRESSURE: 74 MMHG | HEART RATE: 75 BPM | BODY MASS INDEX: 40.81 KG/M2 | SYSTOLIC BLOOD PRESSURE: 118 MMHG | OXYGEN SATURATION: 98 % | WEIGHT: 260 LBS | HEIGHT: 67 IN

## 2019-05-24 DIAGNOSIS — Z30.431 IUD CHECK UP: Primary | ICD-10-CM

## 2019-05-24 PROCEDURE — 99213 OFFICE O/P EST LOW 20 MIN: CPT | Performed by: NURSE PRACTITIONER

## 2019-05-24 ASSESSMENT — MIFFLIN-ST. JEOR: SCORE: 1981.98

## 2019-05-24 ASSESSMENT — PAIN SCALES - GENERAL: PAINLEVEL: NO PAIN (0)

## 2019-05-24 NOTE — PROGRESS NOTES
"[facility] [place]                HPI   Mayra presents for 6 week Paragard IUD check.  She is tolerating it well.  No abnormal bleeding or cramping.  Denies concerns.              Medications:     Current Outpatient Medications Ordered in Epic   Medication     albuterol (PROAIR HFA, PROVENTIL HFA, VENTOLIN HFA) 108 (90 BASE) MCG/ACT inhaler     buPROPion (WELLBUTRIN XL) 300 MG 24 hr tablet     IBUPROFEN PO     LORazepam (ATIVAN) 0.5 MG tablet     metoprolol tartrate (LOPRESSOR) 25 MG tablet     sertraline (ZOLOFT) 100 MG tablet     Current Facility-Administered Medications Ordered in Epic   Medication Dose Route Frequency Last Rate Last Dose     paragard intrauterine copper IUD device 1 each  1 each Intrauterine Continuous                    Allergies:   Patient has no known allergies.         Review of Systems:   The 5 point Review of Systems is negative other than noted in the HPI                     Physical Exam:   Blood pressure 118/74, pulse 75, height 1.702 m (5' 7\"), weight 117.9 kg (260 lb), last menstrual period 05/20/2019, SpO2 98 %, not currently breastfeeding.  Constitutional:   awake, alert, cooperative, no apparent distress, and appears stated age     Genitounirinary:   External Genitalia:  General appearance; normal, Lesions absent  Vagina:  Discharge absent, Lesions absent  Cervix:  General appearance normal, IUD strings visible             Assessment and Plan:   IUD check -  Tolerating well.  Follow up annually and as needed.     ANGEL Ulloa  5/24/2019  12:35 PM  "

## 2019-05-24 NOTE — NURSING NOTE
"Chief Complaint   Patient presents with     IUD     Check        Initial /74 (BP Location: Right arm, Patient Position: Sitting, Cuff Size: Adult Large)   Pulse 75   Ht 1.702 m (5' 7\")   Wt 117.9 kg (260 lb)   LMP 05/20/2019   SpO2 98%   BMI 40.72 kg/m   Estimated body mass index is 40.72 kg/m  as calculated from the following:    Height as of this encounter: 1.702 m (5' 7\").    Weight as of this encounter: 117.9 kg (260 lb).  Medication Reconciliation: complete    Francia Becker LPN    "

## 2019-05-24 NOTE — PATIENT INSTRUCTIONS
What Paragard Users May Expect    What to watch for right after Paragard is placed  Some women may experience uterine cramps, bleeding, and/or dizziness during and right after Paragard is placed. To help minimize the cramps, you may taken ibuprofen 600 mg with food prior to your appointment. These symptoms should improve over the next 24 hours.  Mild cramping may be present for a few days after your placement  As a follow up, you should check your strings in 4 weeks, or visit your clinic once in the first 4 to 12 weeks after Paragard is placed to make sure it is in the right position. After that, Paragard can be checked once a year as part of your routine exam.    Please use a back-up method (abstinence or condoms) for 5 days after placement.    Your periods may change  Some women may have heavier and longer periods with cramping for a while; some may have spotting between periods. These side effects usually lessen after a few months. However, if your menstrual flow is severe or prolonged, call your healthcare professional. You should also call your healthcare professional if you miss a period.    Paragard Strings  You may check your own Paragard strings by inserting a finger into the vagina and feeling the strings as they exit the cervix.  The strings will initially feel firm, but will soften over a few weeks.  After the strings have softened, you or your partner should not be able to feel the strings during intercourse.  If you can feel the IUD, see your healthcare provider to have the position confirmed.  You may continue to use tampons with the IUD in place.    Paragard does not protect against HIV or STDs.  Paragard does not prevent the formation of ovarian cysts.  Paragard does not typically reduce acne or cause weight gain or mood changes.    Please call [facility] at [phone] if you have questions or concerns.    For more information:  [email or web address]

## 2019-06-21 DIAGNOSIS — F34.1 DYSTHYMIA: Primary | ICD-10-CM

## 2019-06-21 RX ORDER — DESVENLAFAXINE 50 MG/1
50 TABLET, FILM COATED, EXTENDED RELEASE ORAL DAILY
Qty: 30 TABLET | Refills: 3 | Status: SHIPPED | OUTPATIENT
Start: 2019-06-21 | End: 2019-07-26

## 2019-07-18 NOTE — PROGRESS NOTES
Subjective     Mayra Del Cid is a 19 year old female who presents to clinic today for the following health issues:    HPI   Depression and Anxiety Follow-Up    How are you doing with your depression since your last visit? Improved     How are you doing with your anxiety since your last visit?  Improved markidly    Are you having other symptoms that might be associated with depression or anxiety? No    Have you had a significant life event? No     Do you have any concerns with your use of alcohol or other drugs? No    Social History     Tobacco Use     Smoking status: Former Smoker     Packs/day: 1.00     Years: 3.00     Pack years: 3.00     Types: Cigarettes     Start date: 2016     Last attempt to quit: 10/17/2018     Years since quittin.7     Smokeless tobacco: Never Used   Substance Use Topics     Alcohol use: Yes     Comment: occ     Drug use: No     PHQ 2019   PHQ-9 Total Score 5 14 10   Q9: Thoughts of better off dead/self-harm past 2 weeks Not at all Not at all Not at all   F/U: Thoughts of suicide or self-harm - - -   F/U: Safety concerns - - -     VIPUL-7 SCORE 2019   Total Score 15 6 8     No flowsheet data found.  No flowsheet data found.      Suicide Assessment Five-step Evaluation and Treatment (SAFE-T)    Amount of exercise or physical activity: 4-5 days/week for an average of 30-45 minutes    Problems taking medications regularly: No    Medication side effects: none    Diet: regular (no restrictions)          Patient Active Problem List   Diagnosis     Generalized headache     Pain in joint, shoulder region     Somatic dysfunction of cervical region     Disorder of bursae and tendons in shoulder region     Sprain and strain of hip and thigh     Pain in joint, pelvic region and thigh     Enthesopathy of hip region     ACP (advance care planning)     Moderate single current episode of major depressive disorder (H)     VIPUL (generalized anxiety  disorder)     Past Surgical History:   Procedure Laterality Date     ORTHOPEDIC SURGERY      right labrum surgery        Social History     Tobacco Use     Smoking status: Former Smoker     Packs/day: 1.00     Years: 3.00     Pack years: 3.00     Types: Cigarettes     Start date: 2016     Last attempt to quit: 10/17/2018     Years since quittin.7     Smokeless tobacco: Never Used   Substance Use Topics     Alcohol use: Yes     Comment: occ     Family History   Problem Relation Age of Onset     Hypertension Mother      Depression Mother      Anxiety Disorder Mother      Asthma Mother      Depression Father      Substance Abuse Father      Diabetes Maternal Grandmother      Heart Disease Maternal Grandmother      Thyroid Disease Maternal Grandmother      Depression Maternal Grandmother      Anxiety Disorder Maternal Grandmother      Asthma Maternal Grandmother      Diabetes Maternal Grandfather      Heart Disease Maternal Grandfather      Chronic Obstructive Pulmonary Disease Paternal Grandmother          Current Outpatient Medications   Medication Sig Dispense Refill     albuterol (PROAIR HFA, PROVENTIL HFA, VENTOLIN HFA) 108 (90 BASE) MCG/ACT inhaler Inhale 2 puffs into the lungs       desvenlafaxine (PRISTIQ) 50 MG 24 hr tablet Take 1 tablet (50 mg) by mouth daily 30 tablet 3     IBUPROFEN PO Take 600 mg by mouth every 8 hours as needed for moderate pain       LORazepam (ATIVAN) 0.5 MG tablet Take 1 tablet (0.5 mg) by mouth every 6 hours as needed for anxiety 15 tablet 0     metoprolol tartrate (LOPRESSOR) 25 MG tablet Take 1 tablet (25 mg) by mouth 2 times daily 180 tablet 1     No Known Allergies  Recent Labs   Lab Test 17  1713 16  0900   TSH 0.91 1.13      BP Readings from Last 3 Encounters:   19 116/76   19 118/74   19 122/70    Wt Readings from Last 3 Encounters:   19 119.7 kg (263 lb 12.8 oz)   19 117.9 kg (260 lb)   19 121.6 kg (268 lb) (>99 %)*     *  "Growth percentiles are based on CDC (Girls, 2-20 Years) data.                      Reviewed and updated as needed this visit by Provider         Review of Systems   ROS COMP: Constitutional, HEENT, cardiovascular, pulmonary, gi and gu systems are negative, except as otherwise noted.      Objective    /76 (BP Location: Right arm, Patient Position: Sitting, Cuff Size: Adult Large)   Pulse 66   Temp 97.9  F (36.6  C) (Tympanic)   Ht 1.702 m (5' 7\")   Wt 119.7 kg (263 lb 12.8 oz)   LMP  (LMP Unknown)   SpO2 97%   BMI 41.32 kg/m    Body mass index is 41.32 kg/m .  Physical Exam   GENERAL: healthy, alert and no distress  PSYCH: mentation appears normal, affect normal/bright  PSYCH: she finds the cloud is lifted and able to feel frederick.     Diagnostic Test Results:  Labs reviewed in Epic  No results found for this or any previous visit (from the past 24 hour(s)).        Assessment & Plan   1. Dysthymia  She is feeling better. On Pristiq was almost immediate after taper off the Zoloft.  She has been feeling energetic awake and happy. She is going to be seeing us back in 3 months. She will let us know if things are not helping in a few weeks. Not feeling any need for adding in dose adjustment at this time.   - desvenlafaxine (PRISTIQ) 50 MG 24 hr tablet; Take 1 tablet (50 mg) by mouth daily  Dispense: 30 tablet; Refill: 3         BMI:   Estimated body mass index is 41.32 kg/m  as calculated from the following:    Height as of this encounter: 1.702 m (5' 7\").    Weight as of this encounter: 119.7 kg (263 lb 12.8 oz).   Weight management plan: Discussed healthy diet and exercise guidelines        See Patient Instructions    No follow-ups on file.    SANDER Grace  [facility] - [place]      "

## 2019-07-26 ENCOUNTER — OFFICE VISIT (OUTPATIENT)
Dept: FAMILY MEDICINE | Facility: OTHER | Age: 19
End: 2019-07-26
Attending: PHYSICIAN ASSISTANT
Payer: COMMERCIAL

## 2019-07-26 VITALS
DIASTOLIC BLOOD PRESSURE: 76 MMHG | HEIGHT: 67 IN | HEART RATE: 66 BPM | TEMPERATURE: 97.9 F | WEIGHT: 263.8 LBS | BODY MASS INDEX: 41.4 KG/M2 | SYSTOLIC BLOOD PRESSURE: 116 MMHG | OXYGEN SATURATION: 97 %

## 2019-07-26 DIAGNOSIS — F34.1 DYSTHYMIA: ICD-10-CM

## 2019-07-26 PROCEDURE — 99213 OFFICE O/P EST LOW 20 MIN: CPT | Performed by: PHYSICIAN ASSISTANT

## 2019-07-26 RX ORDER — DESVENLAFAXINE 50 MG/1
50 TABLET, FILM COATED, EXTENDED RELEASE ORAL DAILY
Qty: 30 TABLET | Refills: 3 | Status: SHIPPED | OUTPATIENT
Start: 2019-07-26 | End: 2019-10-28

## 2019-07-26 ASSESSMENT — ANXIETY QUESTIONNAIRES
5. BEING SO RESTLESS THAT IT IS HARD TO SIT STILL: SEVERAL DAYS
7. FEELING AFRAID AS IF SOMETHING AWFUL MIGHT HAPPEN: SEVERAL DAYS
GAD7 TOTAL SCORE: 8
3. WORRYING TOO MUCH ABOUT DIFFERENT THINGS: SEVERAL DAYS
2. NOT BEING ABLE TO STOP OR CONTROL WORRYING: SEVERAL DAYS
1. FEELING NERVOUS, ANXIOUS, OR ON EDGE: SEVERAL DAYS
6. BECOMING EASILY ANNOYED OR IRRITABLE: SEVERAL DAYS

## 2019-07-26 ASSESSMENT — PATIENT HEALTH QUESTIONNAIRE - PHQ9
SUM OF ALL RESPONSES TO PHQ QUESTIONS 1-9: 10
5. POOR APPETITE OR OVEREATING: MORE THAN HALF THE DAYS

## 2019-07-26 ASSESSMENT — MIFFLIN-ST. JEOR: SCORE: 1999.22

## 2019-07-26 ASSESSMENT — PAIN SCALES - GENERAL: PAINLEVEL: NO PAIN (0)

## 2019-07-26 NOTE — PATIENT INSTRUCTIONS
Thank you for choosing [facility].   I have office hours 8:00 am to 4:30 pm on Monday's, Wednesday's, Thursday's and Friday's. My nurse and I are out of the office every Tuesday.    Following your visit, when your labs and diagnostic testing have returned, I will review then and you will be contacted by my nurse.  If you are on My Chart, you can also view results there.    For refills, notify your pharmacy regarding what you need and the pharmacy will generate a refill request. Do not call my nurse as she is unable to process refill request. Please plan ahead and allow 3-5 days for refill requests.    You will generally receive a reminder call the day prior to your appointment.  If you cannot attend your appointment, please cancel your appointment with as much notice as possible.  If there is a pattern of failure to present for your appointments, I cannot provide consistent, meaningful, ongoing care for you. It is very important to me that you come in for your care, so we can best assist you with your health care needs.    IMPORTANT:  Please note that it is my standard of practice to NOT participate in prescribing ongoing requested Narcotic Analgesic therapy, and/or participate in the prescribing of other controlled substances.  My nurse and I am happy to assist you with the process of referral for alternative pain management as needed, and other treatment modalities including but not limited to:  Physical Therapy, Physical Medicine and Rehab, Counseling, Chiropractic Care, Orthopedic Care, and non-narcotic medication management.     In the event that you need to be seen for emergent concerns and I am out of office,  please see one of my colleagues for acute concerns.  You may also present to  or ER.  I appreciate the opportunity to serve you and look forward to supporting your healthcare needs in the future. Please contact me with any questions or concerns that you may  have.    Sincerely,      Ceci Bernal RN, PA-C

## 2019-07-26 NOTE — NURSING NOTE
"Chief Complaint   Patient presents with     Recheck Medication       Initial /76 (BP Location: Right arm, Patient Position: Sitting, Cuff Size: Adult Large)   Pulse 66   Temp 97.9  F (36.6  C) (Tympanic)   Ht 1.702 m (5' 7\")   Wt 119.7 kg (263 lb 12.8 oz)   LMP  (LMP Unknown)   SpO2 97%   BMI 41.32 kg/m   Estimated body mass index is 41.32 kg/m  as calculated from the following:    Height as of this encounter: 1.702 m (5' 7\").    Weight as of this encounter: 119.7 kg (263 lb 12.8 oz).  Medication Reconciliation: complete  "

## 2019-07-27 ASSESSMENT — ANXIETY QUESTIONNAIRES: GAD7 TOTAL SCORE: 8

## 2019-10-25 NOTE — PROGRESS NOTES
Subjective     Mayra Del Cid is a 19 year old female who presents to clinic today for the following health issues:    HPI   Depression and Anxiety Follow-Up    How are you doing with your depression since your last visit? Worsened     How are you doing with your anxiety since your last visit?  No change    Are you having other symptoms that might be associated with depression or anxiety? Yes:  sad, uncontrollably crying    Have you had a significant life event? Job Concerns     Do you have any concerns with your use of alcohol or other drugs? No    Social History     Tobacco Use     Smoking status: Former Smoker     Packs/day: 1.00     Years: 3.00     Pack years: 3.00     Types: Cigarettes     Start date: 2016     Last attempt to quit: 10/17/2018     Years since quittin.0     Smokeless tobacco: Never Used   Substance Use Topics     Alcohol use: Yes     Comment: occ     Drug use: No     PHQ 2019 2019 10/28/2019   PHQ-9 Total Score 14 10 14   Q9: Thoughts of better off dead/self-harm past 2 weeks Not at all Not at all Not at all   F/U: Thoughts of suicide or self-harm - - -   F/U: Safety concerns - - -     VIPUL-7 SCORE 2019 2019 10/28/2019   Total Score 6 8 10     No flowsheet data found.  No flowsheet data found.      Suicide Assessment Five-step Evaluation and Treatment (SAFE-T)      How many servings of fruits and vegetables do you eat daily?  2-3    On average, how many sweetened beverages do you drink each day (soda, juice, sweet tea, etc)?   0    How many days per week do you miss taking your medication? 0            Patient Active Problem List   Diagnosis     Generalized headache     Pain in joint, shoulder region     Somatic dysfunction of cervical region     Disorder of bursae and tendons in shoulder region     Sprain and strain of hip and thigh     Pain in joint, pelvic region and thigh     Enthesopathy of hip region     ACP (advance care planning)     Moderate single current  episode of major depressive disorder (H)     VIPUL (generalized anxiety disorder)     Past Surgical History:   Procedure Laterality Date     ORTHOPEDIC SURGERY      right labrum surgery        Social History     Tobacco Use     Smoking status: Former Smoker     Packs/day: 1.00     Years: 3.00     Pack years: 3.00     Types: Cigarettes     Start date: 2016     Last attempt to quit: 10/17/2018     Years since quittin.0     Smokeless tobacco: Never Used   Substance Use Topics     Alcohol use: Yes     Comment: occ     Family History   Problem Relation Age of Onset     Hypertension Mother      Depression Mother      Anxiety Disorder Mother      Asthma Mother      Depression Father      Substance Abuse Father      Diabetes Maternal Grandmother      Heart Disease Maternal Grandmother      Thyroid Disease Maternal Grandmother      Depression Maternal Grandmother      Anxiety Disorder Maternal Grandmother      Asthma Maternal Grandmother      Diabetes Maternal Grandfather      Heart Disease Maternal Grandfather      Chronic Obstructive Pulmonary Disease Paternal Grandmother          Current Outpatient Medications   Medication Sig Dispense Refill     albuterol (PROAIR HFA, PROVENTIL HFA, VENTOLIN HFA) 108 (90 BASE) MCG/ACT inhaler Inhale 2 puffs into the lungs       desvenlafaxine (PRISTIQ) 50 MG 24 hr tablet Take 1 tablet (50 mg) by mouth daily 30 tablet 3     IBUPROFEN PO Take 600 mg by mouth every 8 hours as needed for moderate pain       LORazepam (ATIVAN) 0.5 MG tablet Take 1 tablet (0.5 mg) by mouth every 6 hours as needed for anxiety 15 tablet 0     metoprolol tartrate (LOPRESSOR) 25 MG tablet Take 1 tablet (25 mg) by mouth 2 times daily 180 tablet 1     No Known Allergies  Recent Labs   Lab Test 17  1713 16  0900   TSH 0.91 1.13      BP Readings from Last 3 Encounters:   10/28/19 122/74   19 116/76   19 118/74    Wt Readings from Last 3 Encounters:   10/28/19 123.4 kg (272 lb)   19  "119.7 kg (263 lb 12.8 oz)   05/24/19 117.9 kg (260 lb)                      Reviewed and updated as needed this visit by Provider         Review of Systems   ROS COMP: Constitutional, HEENT, cardiovascular, pulmonary, gi and gu systems are negative, except as otherwise noted.      Objective    /74 (Patient Position: Sitting)   Pulse 100   Ht 1.702 m (5' 7\")   Wt 123.4 kg (272 lb)   SpO2 98%   BMI 42.60 kg/m    Body mass index is 42.6 kg/m .  Physical Exam   GENERAL: healthy, alert and no distress  EYES: Eyes grossly normal to inspection, PERRL and conjunctivae and sclerae normal  HENT: ear canals and TM's normal, nose and mouth without ulcers or lesions  NECK: no adenopathy, no asymmetry, masses, or scars and thyroid normal to palpation  RESP: lungs clear to auscultation - no rales, rhonchi or wheezes  CV: regular rate and rhythm, normal S1 S2, no S3 or S4, no murmur, click or rub, no peripheral edema and peripheral pulses strong  MS: no gross musculoskeletal defects noted, no edema  SKIN: no suspicious lesions or rashes    Diagnostic Test Results:  Labs reviewed in Epic  No results found for this or any previous visit (from the past 24 hour(s)).        Assessment & Plan     1. Dysthymia  I don't think she has been on long enough to get a good idea. We will give a refill and see us back. She feels this is helping some. Will be recheck in 3 months  - desvenlafaxine (PRISTIQ) 50 MG 24 hr tablet; Take 1 tablet (50 mg) by mouth daily  Dispense: 30 tablet; Refill: 3    2. VIPUL (generalized anxiety disorder)  She is going to be contineud on lopressor. See us back in 3 months. Helps panic  - metoprolol tartrate (LOPRESSOR) 25 MG tablet; Take 1 tablet (25 mg) by mouth 2 times daily  Dispense: 180 tablet; Refill: 1     BMI:   Estimated body mass index is 42.6 kg/m  as calculated from the following:    Height as of this encounter: 1.702 m (5' 7\").    Weight as of this encounter: 123.4 kg (272 lb).   Weight " management plan: Discussed healthy diet and exercise guidelines        See Patient Instructions    No follow-ups on file.    Ceci Bernal PA-C  [facility]

## 2019-10-28 ENCOUNTER — OFFICE VISIT (OUTPATIENT)
Dept: FAMILY MEDICINE | Facility: OTHER | Age: 19
End: 2019-10-28
Attending: PHYSICIAN ASSISTANT
Payer: COMMERCIAL

## 2019-10-28 VITALS
OXYGEN SATURATION: 98 % | HEIGHT: 67 IN | WEIGHT: 272 LBS | DIASTOLIC BLOOD PRESSURE: 74 MMHG | SYSTOLIC BLOOD PRESSURE: 122 MMHG | BODY MASS INDEX: 42.69 KG/M2 | HEART RATE: 100 BPM

## 2019-10-28 DIAGNOSIS — F41.1 GAD (GENERALIZED ANXIETY DISORDER): ICD-10-CM

## 2019-10-28 DIAGNOSIS — F34.1 DYSTHYMIA: ICD-10-CM

## 2019-10-28 PROCEDURE — 99213 OFFICE O/P EST LOW 20 MIN: CPT | Performed by: PHYSICIAN ASSISTANT

## 2019-10-28 RX ORDER — METOPROLOL TARTRATE 25 MG/1
25 TABLET, FILM COATED ORAL 2 TIMES DAILY
Qty: 180 TABLET | Refills: 1 | Status: SHIPPED | OUTPATIENT
Start: 2019-10-28 | End: 2020-12-15

## 2019-10-28 RX ORDER — DESVENLAFAXINE 50 MG/1
50 TABLET, FILM COATED, EXTENDED RELEASE ORAL DAILY
Qty: 30 TABLET | Refills: 3 | Status: SHIPPED | OUTPATIENT
Start: 2019-10-28 | End: 2019-12-08

## 2019-10-28 ASSESSMENT — ANXIETY QUESTIONNAIRES
GAD7 TOTAL SCORE: 10
7. FEELING AFRAID AS IF SOMETHING AWFUL MIGHT HAPPEN: SEVERAL DAYS
4. TROUBLE RELAXING: SEVERAL DAYS
1. FEELING NERVOUS, ANXIOUS, OR ON EDGE: MORE THAN HALF THE DAYS
3. WORRYING TOO MUCH ABOUT DIFFERENT THINGS: SEVERAL DAYS
2. NOT BEING ABLE TO STOP OR CONTROL WORRYING: SEVERAL DAYS
6. BECOMING EASILY ANNOYED OR IRRITABLE: MORE THAN HALF THE DAYS
5. BEING SO RESTLESS THAT IT IS HARD TO SIT STILL: MORE THAN HALF THE DAYS

## 2019-10-28 ASSESSMENT — PAIN SCALES - GENERAL: PAINLEVEL: NO PAIN (0)

## 2019-10-28 ASSESSMENT — MIFFLIN-ST. JEOR: SCORE: 2036.41

## 2019-10-28 ASSESSMENT — PATIENT HEALTH QUESTIONNAIRE - PHQ9: SUM OF ALL RESPONSES TO PHQ QUESTIONS 1-9: 14

## 2019-10-28 NOTE — NURSING NOTE
"Chief Complaint   Patient presents with     Depression     Anxiety       Initial /74 (Patient Position: Sitting)   Pulse 100   Ht 1.702 m (5' 7\")   Wt 123.4 kg (272 lb)   SpO2 98%   BMI 42.60 kg/m   Estimated body mass index is 42.6 kg/m  as calculated from the following:    Height as of this encounter: 1.702 m (5' 7\").    Weight as of this encounter: 123.4 kg (272 lb).  Medication Reconciliation: complete  Deepali Benton LPN  "

## 2019-10-28 NOTE — PATIENT INSTRUCTIONS
Thank you for choosing [facility].   I have office hours 8:00 am to 4:30 pm on Monday's, Wednesday's, Thursday's and Friday's. My nurse and I are out of the office every Tuesday.    Following your visit, when your labs and diagnostic testing have returned, I will review then and you will be contacted by my nurse.  If you are on My Chart, you can also view results there.    For refills, notify your pharmacy regarding what you need and the pharmacy will generate a refill request. Do not call my nurse as she is unable to process refill request. Please plan ahead and allow 3-5 days for refill requests.    You will generally receive a reminder call the day prior to your appointment.  If you cannot attend your appointment, please cancel your appointment with as much notice as possible.  If there is a pattern of failure to present for your appointments, I cannot provide consistent, meaningful, ongoing care for you. It is very important to me that you come in for your care, so we can best assist you with your health care needs.    IMPORTANT:  Please note that it is my standard of practice to NOT participate in prescribing ongoing requested Narcotic Analgesic therapy, and/or participate in the prescribing of other controlled substances.  My nurse and I am happy to assist you with the process of referral for alternative pain management as needed, and other treatment modalities including but not limited to:  Physical Therapy, Physical Medicine and Rehab, Counseling, Chiropractic Care, Orthopedic Care, and non-narcotic medication management.     In the event that you need to be seen for emergent concerns and I am out of office,  please see one of my colleagues for acute concerns.  You may also present to  or ER.  I appreciate the opportunity to serve you and look forward to supporting your healthcare needs in the future. Please contact me with any questions or concerns that you may  have.    Sincerely,      Ceci Bernal RN, PA-C

## 2019-10-29 ASSESSMENT — ANXIETY QUESTIONNAIRES: GAD7 TOTAL SCORE: 10

## 2019-12-08 DIAGNOSIS — F34.1 DYSTHYMIA: ICD-10-CM

## 2019-12-10 RX ORDER — DESVENLAFAXINE 50 MG/1
TABLET, FILM COATED, EXTENDED RELEASE ORAL
Qty: 30 TABLET | Refills: 3 | Status: SHIPPED | OUTPATIENT
Start: 2019-12-10 | End: 2020-04-20

## 2019-12-10 NOTE — TELEPHONE ENCOUNTER
Pristiq      Last Written Prescription Date:  10/28/2019  Last Fill Quantity: 30,   # refills: 3  Last Office Visit: 10/28/2019  Future Office visit:

## 2020-02-25 ENCOUNTER — MYC REFILL (OUTPATIENT)
Dept: FAMILY MEDICINE | Facility: OTHER | Age: 20
End: 2020-02-25

## 2020-02-25 DIAGNOSIS — F41.1 GAD (GENERALIZED ANXIETY DISORDER): ICD-10-CM

## 2020-02-26 RX ORDER — LORAZEPAM 0.5 MG/1
0.5 TABLET ORAL EVERY 6 HOURS PRN
Qty: 15 TABLET | Refills: 0 | Status: SHIPPED | OUTPATIENT
Start: 2020-02-26 | End: 2020-04-29

## 2020-02-26 NOTE — TELEPHONE ENCOUNTER
Ativan      Last Written Prescription Date:  4/10/19  Last Fill Quantity: 15,   # refills: 0  Last Office Visit: 10/28/19  Future Office visit:    Next 5 appointments (look out 90 days)    Apr 29, 2020 10:20 AM CDT  (Arrive by 10:05 AM)  SHORT with SANDER Beltran  [facility] ([facility] ) [address]  [place] [address]  [phone]           Routing refill request to provider for review/approval because:  Drug not on the [facility], [facility] or  Health refill protocol or controlled substance

## 2020-04-20 DIAGNOSIS — F34.1 DYSTHYMIA: ICD-10-CM

## 2020-04-20 RX ORDER — DESVENLAFAXINE 50 MG/1
TABLET, FILM COATED, EXTENDED RELEASE ORAL
Qty: 30 TABLET | Refills: 3 | Status: SHIPPED | OUTPATIENT
Start: 2020-04-20 | End: 2020-04-29

## 2020-04-20 NOTE — TELEPHONE ENCOUNTER
desvenlafaxine      Last Written Prescription Date:  12/10/2019  Last Fill Quantity: 30,   # refills: 3  Last Office Visit: 10/28/2019  Future Office visit:    Next 5 appointments (look out 90 days)    Apr 29, 2020 10:20 AM CDT  Telephone Visit with SANDER Beltran  [facility] ([facility] ) [address]  [facility] [address]  [phone]           Routing refill request to provider for review/approval because:  Failed protocol due to phq9 and creatinine    No results found for: CR  PHQ-9 score:    PHQ 10/28/2019   PHQ-9 Total Score 14   Q9: Thoughts of better off dead/self-harm past 2 weeks Not at all   F/U: Thoughts of suicide or self-harm -   F/U: Safety concerns -

## 2020-04-29 ENCOUNTER — VIRTUAL VISIT (OUTPATIENT)
Dept: FAMILY MEDICINE | Facility: OTHER | Age: 20
End: 2020-04-29
Attending: PHYSICIAN ASSISTANT
Payer: COMMERCIAL

## 2020-04-29 DIAGNOSIS — F41.1 GAD (GENERALIZED ANXIETY DISORDER): ICD-10-CM

## 2020-04-29 DIAGNOSIS — F99 INSOMNIA DUE TO OTHER MENTAL DISORDER: Primary | ICD-10-CM

## 2020-04-29 DIAGNOSIS — F34.1 DYSTHYMIA: ICD-10-CM

## 2020-04-29 DIAGNOSIS — F51.05 INSOMNIA DUE TO OTHER MENTAL DISORDER: Primary | ICD-10-CM

## 2020-04-29 PROCEDURE — 99213 OFFICE O/P EST LOW 20 MIN: CPT | Mod: 95 | Performed by: PHYSICIAN ASSISTANT

## 2020-04-29 RX ORDER — DESVENLAFAXINE 50 MG/1
50 TABLET, FILM COATED, EXTENDED RELEASE ORAL DAILY
Qty: 30 TABLET | Refills: 3 | Status: SHIPPED | OUTPATIENT
Start: 2020-04-29 | End: 2020-11-03

## 2020-04-29 RX ORDER — ZOLPIDEM TARTRATE 5 MG/1
5 TABLET ORAL
Qty: 30 TABLET | Refills: 0 | Status: SHIPPED | OUTPATIENT
Start: 2020-04-29 | End: 2020-05-14

## 2020-04-29 RX ORDER — LORAZEPAM 0.5 MG/1
0.5 TABLET ORAL EVERY 6 HOURS PRN
Qty: 30 TABLET | Refills: 0 | Status: SHIPPED | OUTPATIENT
Start: 2020-04-29 | End: 2020-08-14

## 2020-04-29 ASSESSMENT — ANXIETY QUESTIONNAIRES
3. WORRYING TOO MUCH ABOUT DIFFERENT THINGS: MORE THAN HALF THE DAYS
GAD7 TOTAL SCORE: 12
2. NOT BEING ABLE TO STOP OR CONTROL WORRYING: MORE THAN HALF THE DAYS
5. BEING SO RESTLESS THAT IT IS HARD TO SIT STILL: NOT AT ALL
6. BECOMING EASILY ANNOYED OR IRRITABLE: MORE THAN HALF THE DAYS
1. FEELING NERVOUS, ANXIOUS, OR ON EDGE: MORE THAN HALF THE DAYS
7. FEELING AFRAID AS IF SOMETHING AWFUL MIGHT HAPPEN: MORE THAN HALF THE DAYS

## 2020-04-29 ASSESSMENT — PATIENT HEALTH QUESTIONNAIRE - PHQ9
SUM OF ALL RESPONSES TO PHQ QUESTIONS 1-9: 12
5. POOR APPETITE OR OVEREATING: MORE THAN HALF THE DAYS

## 2020-04-29 ASSESSMENT — PAIN SCALES - GENERAL: PAINLEVEL: NO PAIN (0)

## 2020-04-29 NOTE — PROGRESS NOTES
"Mayra Del Cid is a 19 year old female who is being evaluated via a billable telephone visit.      The patient has been notified of following:     \"This telephone visit will be conducted via a call between you and your physician/provider. We have found that certain health care needs can be provided without the need for a physical exam.  This service lets us provide the care you need with a short phone conversation.  If a prescription is necessary we can send it directly to your pharmacy.  If lab work is needed we can place an order for that and you can then stop by our lab to have the test done at a later time.    Telephone visits are billed at different rates depending on your insurance coverage. During this emergency period, for some insurers they may be billed the same as an in-person visit.  Please reach out to your insurance provider with any questions.    If during the course of the call the physician/provider feels a telephone visit is not appropriate, you will not be charged for this service.\"    Patient has given verbal consent for Telephone visit?  Yes    How would you like to obtain your AVS? Amberhart    Subjective     Mayra Del Cid is a 19 year old female who presents to clinic today for the following health issues:    HPI  Depression and Anxiety Follow-Up    How are you doing with your depression since your last visit? No change    How are you doing with your anxiety since your last visit?  Worsened     Are you having other symptoms that might be associated with depression or anxiety? No    Have you had a significant life event? OTHER: parents are splitting up     Do you have any concerns with your use of alcohol or other drugs? No    Social History     Tobacco Use     Smoking status: Former Smoker     Packs/day: 1.00     Years: 3.00     Pack years: 3.00     Types: Cigarettes     Start date: 2016     Last attempt to quit: 10/17/2018     Years since quittin.5     Smokeless tobacco: Never Used "   Substance Use Topics     Alcohol use: Yes     Comment: occ     Drug use: No     PHQ 7/26/2019 10/28/2019 4/29/2020   PHQ-9 Total Score 10 14 12   Q9: Thoughts of better off dead/self-harm past 2 weeks Not at all Not at all Not at all   F/U: Thoughts of suicide or self-harm - - -   F/U: Safety concerns - - -     VIPUL-7 SCORE 7/26/2019 10/28/2019 4/29/2020   Total Score 8 10 12     Last PHQ-9 4/29/2020   1.  Little interest or pleasure in doing things 1   2.  Feeling down, depressed, or hopeless 1   3.  Trouble falling or staying asleep, or sleeping too much 3   4.  Feeling tired or having little energy 2   5.  Poor appetite or overeating 2   6.  Feeling bad about yourself 1   7.  Trouble concentrating 2   8.  Moving slowly or restless 0   Q9: Thoughts of better off dead/self-harm past 2 weeks 0   PHQ-9 Total Score 12   Difficulty at work, home, or with people -   In the past two weeks have you had thoughts of suicide or self harm? -   Do you have concerns about your personal safety or the safety of others? -     VIPUL-7  4/29/2020   1. Feeling nervous, anxious, or on edge 2   2. Not being able to stop or control worrying 2   3. Worrying too much about different things 2   4. Trouble relaxing 2   5. Being so restless that it is hard to sit still 0   6. Becoming easily annoyed or irritable 2   7. Feeling afraid, as if something awful might happen 2   VIPUL-7 Total Score 12   If you checked any problems, how difficult have they made it for you to do your work, take care of things at home, or get along with other people? -         Suicide Assessment Five-step Evaluation and Treatment (SAFE-T)               Patient Active Problem List   Diagnosis     Generalized headache     Pain in joint, shoulder region     Somatic dysfunction of cervical region     Disorder of bursae and tendons in shoulder region     Sprain and strain of hip and thigh     Pain in joint, pelvic region and thigh     Enthesopathy of hip region     ACP  (advance care planning)     Moderate single current episode of major depressive disorder (H)     VIPUL (generalized anxiety disorder)     Past Surgical History:   Procedure Laterality Date     ORTHOPEDIC SURGERY      right labrum surgery        Social History     Tobacco Use     Smoking status: Former Smoker     Packs/day: 0.25     Years: 3.00     Pack years: 0.75     Types: Cigarettes, Cigars, Other     Start date: 2/15/2016     Last attempt to quit: 2019     Years since quittin.0     Smokeless tobacco: Never Used   Substance Use Topics     Alcohol use: Yes     Comment: occ     Family History   Problem Relation Age of Onset     Hypertension Mother      Depression Mother      Anxiety Disorder Mother      Asthma Mother      Depression Father      Substance Abuse Father      Diabetes Maternal Grandmother      Heart Disease Maternal Grandmother      Thyroid Disease Maternal Grandmother      Depression Maternal Grandmother      Anxiety Disorder Maternal Grandmother      Asthma Maternal Grandmother      Diabetes Maternal Grandfather      Heart Disease Maternal Grandfather      Chronic Obstructive Pulmonary Disease Paternal Grandmother          Current Outpatient Medications   Medication Sig Dispense Refill     albuterol (PROAIR HFA, PROVENTIL HFA, VENTOLIN HFA) 108 (90 BASE) MCG/ACT inhaler Inhale 2 puffs into the lungs       desvenlafaxine (PRISTIQ) 50 MG 24 hr tablet TAKE 1 TABLET BY MOUTH EVERY DAY 30 tablet 3     IBUPROFEN PO Take 600 mg by mouth every 8 hours as needed for moderate pain       LORazepam (ATIVAN) 0.5 MG tablet Take 1 tablet (0.5 mg) by mouth every 6 hours as needed for anxiety 15 tablet 0     metoprolol tartrate (LOPRESSOR) 25 MG tablet Take 1 tablet (25 mg) by mouth 2 times daily 180 tablet 1     No Known Allergies  Recent Labs   Lab Test 17  1713 16  0900   TSH 0.91 1.13      BP Readings from Last 3 Encounters:   10/28/19 122/74   19 116/76   19 118/74    Wt Readings  from Last 3 Encounters:   10/28/19 123.4 kg (272 lb)   07/26/19 119.7 kg (263 lb 12.8 oz)   05/24/19 117.9 kg (260 lb)                    Reviewed and updated as needed this visit by Provider         Review of Systems   ROS COMP: Constitutional, HEENT, cardiovascular, pulmonary, gi and gu systems are negative, except as otherwise noted.       Objective   Reported vitals:  There were no vitals taken for this visit.   healthy, alert and no distress  PSYCH: Alert and oriented times 3; coherent speech, normal   rate and volume, able to articulate logical thoughts, able   to abstract reason, no tangential thoughts, no hallucinations   or delusions  Her affect is normal  RESP: No cough, no audible wheezing, able to talk in full sentences  Remainder of exam unable to be completed due to telephone visits    Diagnostic Test Results:  Labs reviewed in Epic        Assessment/Plan:  1. VIPUL (generalized anxiety disorder)  Refill needed. Overwhelmed. Parents divorce and Covid are making her anxiety fly through the roof. Not sleeping. Over eating and emotional. Will try to obtain a therapist. Recommend in [place].   - LORazepam (ATIVAN) 0.5 MG tablet; Take 1 tablet (0.5 mg) by mouth every 6 hours as needed for anxiety  Dispense: 30 tablet; Refill: 0    2. Dysthymia  She is going to be given a refill. Not suicidal. Emotional and tired. Counseling discussion.   - desvenlafaxine (PRISTIQ) 50 MG 24 hr tablet; Take 1 tablet (50 mg) by mouth daily  Dispense: 30 tablet; Refill: 3    3. Insomnia due to other mental disorder  Trazodone is interactive with this patients Natasha. We will give low dose Ambien.   - zolpidem (AMBIEN) 5 MG tablet; Take 1 tablet (5 mg) by mouth nightly as needed for sleep  Dispense: 30 tablet; Refill: 0    No follow-ups on file.      Phone call duration:  10 minutes    SANDER Grace

## 2020-04-30 ASSESSMENT — ANXIETY QUESTIONNAIRES: GAD7 TOTAL SCORE: 12

## 2020-05-14 DIAGNOSIS — F51.05 INSOMNIA DUE TO OTHER MENTAL DISORDER: ICD-10-CM

## 2020-05-14 DIAGNOSIS — F99 INSOMNIA DUE TO OTHER MENTAL DISORDER: ICD-10-CM

## 2020-05-14 RX ORDER — ZOLPIDEM TARTRATE 5 MG/1
TABLET ORAL
Qty: 30 TABLET | Refills: 0 | Status: SHIPPED | OUTPATIENT
Start: 2020-05-14 | End: 2020-06-12

## 2020-05-14 NOTE — TELEPHONE ENCOUNTER
zolpidem (AMBIEN) 5 MG tablet      Last Written Prescription Date:  4/29/20  Last Fill Quantity: 30,   # refills: 0  Last Office Visit: 4/29/20 virtual visit  Future Office visit:       Routing refill request to provider for review/approval because:  Drug not on the [facility], [facility] or [facility] refill protocol or controlled substance

## 2020-06-12 DIAGNOSIS — F99 INSOMNIA DUE TO OTHER MENTAL DISORDER: ICD-10-CM

## 2020-06-12 DIAGNOSIS — F51.05 INSOMNIA DUE TO OTHER MENTAL DISORDER: ICD-10-CM

## 2020-06-12 RX ORDER — ZOLPIDEM TARTRATE 5 MG/1
TABLET ORAL
Qty: 30 TABLET | Refills: 0 | Status: SHIPPED | OUTPATIENT
Start: 2020-06-12 | End: 2020-08-17

## 2020-06-12 NOTE — TELEPHONE ENCOUNTER
ZOLPIDEM 5MG TABLET        Last Written Prescription Date:  5/14/20  Last Fill Quantity: 30,   # refills: 0  Last Office Visit: 4/29/20 Virtual Visit   Future Office visit:       Routing refill request to provider for review/approval because:  Drug not on the [facility], P or [facility] refill protocol or controlled substance

## 2020-08-14 DIAGNOSIS — F41.1 GAD (GENERALIZED ANXIETY DISORDER): ICD-10-CM

## 2020-08-14 RX ORDER — LORAZEPAM 0.5 MG/1
0.5 TABLET ORAL EVERY 6 HOURS PRN
Qty: 30 TABLET | Refills: 1 | Status: SHIPPED | OUTPATIENT
Start: 2020-08-14

## 2020-08-14 NOTE — TELEPHONE ENCOUNTER
lorazepam      Last Written Prescription Date:  4/29/20  Last Fill Quantity: 30,   # refills: 0  Last Office Visit: 4/29/20  Future Office visit:       Routing refill request to provider for review/approval because:

## 2020-08-17 DIAGNOSIS — F99 INSOMNIA DUE TO OTHER MENTAL DISORDER: ICD-10-CM

## 2020-08-17 DIAGNOSIS — F51.05 INSOMNIA DUE TO OTHER MENTAL DISORDER: ICD-10-CM

## 2020-08-17 RX ORDER — ZOLPIDEM TARTRATE 5 MG/1
TABLET ORAL
Qty: 30 TABLET | Refills: 0 | Status: SHIPPED | OUTPATIENT
Start: 2020-08-17

## 2020-08-17 NOTE — TELEPHONE ENCOUNTER
ambien      Last Written Prescription Date:  6/12/20  Last Fill Quantity: 30,   # refills: 0  Last Office Visit: 4/29/20  Future Office visit:       Routing refill request to provider for review/approval because:  Drug not on the [facility], P or [facility] refill protocol or controlled substance

## 2020-12-07 NOTE — PROGRESS NOTES
Subjective     Mayra Del Cid is a 20 year old female who presents to clinic today for the following health issues:    HPI         Depression and Anxiety Follow-Up    How are you doing with your depression since your last visit? Worsened     How are you doing with your anxiety since your last visit?  Worsened     Are you having other symptoms that might be associated with depression or anxiety? No    Have you had a significant life event? OTHER: situtional things going on pt would not elborate     Do you have any concerns with your use of alcohol or other drugs? No    Social History     Tobacco Use     Smoking status: Former Smoker     Packs/day: 0.25     Years: 3.00     Pack years: 0.75     Types: Cigarettes, Cigars, Other     Start date: 2/15/2016     Quit date: 2019     Years since quittin.6     Smokeless tobacco: Never Used   Substance Use Topics     Alcohol use: Yes     Comment: occ     Drug use: No     PHQ 10/28/2019 2020 12/10/2020   PHQ-9 Total Score 14 12 11   Q9: Thoughts of better off dead/self-harm past 2 weeks Not at all Not at all Not at all   F/U: Thoughts of suicide or self-harm - - -   F/U: Safety concerns - - -     VIPUL-7 SCORE 10/28/2019 2020 12/10/2020   Total Score 10 12 19     Last PHQ-9 12/10/2020   1.  Little interest or pleasure in doing things 1   2.  Feeling down, depressed, or hopeless 1   3.  Trouble falling or staying asleep, or sleeping too much 2   4.  Feeling tired or having little energy 2   5.  Poor appetite or overeating 1   6.  Feeling bad about yourself 1   7.  Trouble concentrating 3   8.  Moving slowly or restless 0   Q9: Thoughts of better off dead/self-harm past 2 weeks 0   PHQ-9 Total Score 11   Difficulty at work, home, or with people -   In the past two weeks have you had thoughts of suicide or self harm? -   Do you have concerns about your personal safety or the safety of others? -     VIPUL-7  12/10/2020   1. Feeling nervous, anxious, or on edge 3   2.  Not being able to stop or control worrying 3   3. Worrying too much about different things 3   4. Trouble relaxing 3   5. Being so restless that it is hard to sit still 3   6. Becoming easily annoyed or irritable 3   7. Feeling afraid, as if something awful might happen 1   VIPUL-7 Total Score 19   If you checked any problems, how difficult have they made it for you to do your work, take care of things at home, or get along with other people? -       Suicide Assessment Five-step Evaluation and Treatment (SAFE-T)          Review of Systems   Constitutional, HEENT, cardiovascular, pulmonary, gi and gu systems are negative, except as otherwise noted.      Objective    /64 (BP Location: Left arm, Patient Position: Sitting, Cuff Size: Adult Regular)   Pulse 75   Temp 98.3  F (36.8  C) (Tympanic)   Wt 126.5 kg (278 lb 12.8 oz)   SpO2 98%   BMI 43.67 kg/m    Body mass index is 43.67 kg/m .  Physical Exam   GENERAL: healthy, alert and no distress  EYES: Eyes grossly normal to inspection, PERRL and conjunctivae and sclerae normal  HENT: ear canals and TM's normal, nose and mouth without ulcers or lesions  NECK: no adenopathy, no asymmetry, masses, or scars and thyroid normal to palpation  RESP: lungs clear to auscultation - no rales, rhonchi or wheezes  CV: regular rate and rhythm, normal S1 S2, no S3 or S4, no murmur, click or rub, no peripheral edema and peripheral pulses strong  ABDOMEN: soft, nontender, no hepatosplenomegaly, no masses and bowel sounds normal  MS: no gross musculoskeletal defects noted, no edema  SKIN: no suspicious lesions or rashes  NEURO: Normal strength and tone, mentation intact and speech normal  PSYCH: long discussion on parents divorce and is in the mist of things. He mom is concerned. Finds that her metoprolol helps the physical sx of her panic and the depression is better on Prestiq. selective serotonin reuptake inhibitor did not help her . So we will add in Buspar. Twice a day and see  her back .  No results found for this or any previous visit (from the past 24 hour(s)).        Assessment & Plan     Need for prophylactic vaccination and inoculation against influenza  Getting this.   - [place] FLU VAC PRESRV FREE QUAD SPLIT VIR > 6 MONTHS IM [[id number]]    Dysthymia  Add this in to her program . Anxiety is off e wall. She is just working a lot to help keep her mind busy. Only coming home for [facility] but then going to go back to [place]. She finds and tolerates the other medications.   - busPIRone (BUSPAR) 15 MG tablet; Take 1 tablet (15 mg) by mouth 2 times daily    Morbid obesity (H)  Weight is stable. Very stressed and not gaining which is a good sign.           See Patient Instructions    No follow-ups on file.    Ceci Bernal PA-C  [facility]

## 2020-12-10 ENCOUNTER — OFFICE VISIT (OUTPATIENT)
Dept: FAMILY MEDICINE | Facility: OTHER | Age: 20
End: 2020-12-10
Attending: PHYSICIAN ASSISTANT
Payer: COMMERCIAL

## 2020-12-10 VITALS
HEART RATE: 75 BPM | OXYGEN SATURATION: 98 % | SYSTOLIC BLOOD PRESSURE: 112 MMHG | DIASTOLIC BLOOD PRESSURE: 64 MMHG | TEMPERATURE: 98.3 F | WEIGHT: 278.8 LBS | BODY MASS INDEX: 43.67 KG/M2

## 2020-12-10 DIAGNOSIS — F34.1 DYSTHYMIA: ICD-10-CM

## 2020-12-10 DIAGNOSIS — Z23 NEED FOR PROPHYLACTIC VACCINATION AND INOCULATION AGAINST INFLUENZA: Primary | ICD-10-CM

## 2020-12-10 DIAGNOSIS — E66.01 MORBID OBESITY (H): ICD-10-CM

## 2020-12-10 PROCEDURE — 90471 IMMUNIZATION ADMIN: CPT | Performed by: PHYSICIAN ASSISTANT

## 2020-12-10 PROCEDURE — 90686 IIV4 VACC NO PRSV 0.5 ML IM: CPT | Performed by: PHYSICIAN ASSISTANT

## 2020-12-10 PROCEDURE — 99213 OFFICE O/P EST LOW 20 MIN: CPT | Mod: 25 | Performed by: PHYSICIAN ASSISTANT

## 2020-12-10 RX ORDER — BUSPIRONE HYDROCHLORIDE 15 MG/1
15 TABLET ORAL 2 TIMES DAILY
Qty: 60 TABLET | Refills: 3 | Status: SHIPPED | OUTPATIENT
Start: 2020-12-10 | End: 2021-01-20

## 2020-12-10 ASSESSMENT — PATIENT HEALTH QUESTIONNAIRE - PHQ9: SUM OF ALL RESPONSES TO PHQ QUESTIONS 1-9: 11

## 2020-12-10 ASSESSMENT — ANXIETY QUESTIONNAIRES
2. NOT BEING ABLE TO STOP OR CONTROL WORRYING: NEARLY EVERY DAY
GAD7 TOTAL SCORE: 19
3. WORRYING TOO MUCH ABOUT DIFFERENT THINGS: NEARLY EVERY DAY
7. FEELING AFRAID AS IF SOMETHING AWFUL MIGHT HAPPEN: SEVERAL DAYS
4. TROUBLE RELAXING: NEARLY EVERY DAY
5. BEING SO RESTLESS THAT IT IS HARD TO SIT STILL: NEARLY EVERY DAY
6. BECOMING EASILY ANNOYED OR IRRITABLE: NEARLY EVERY DAY
1. FEELING NERVOUS, ANXIOUS, OR ON EDGE: NEARLY EVERY DAY

## 2020-12-10 ASSESSMENT — PAIN SCALES - GENERAL: PAINLEVEL: NO PAIN (0)

## 2020-12-10 NOTE — NURSING NOTE
"Chief Complaint   Patient presents with     Medication Follow-up       Initial /64 (BP Location: Left arm, Patient Position: Sitting, Cuff Size: Adult Regular)   Pulse 75   Temp 98.3  F (36.8  C) (Tympanic)   Wt 126.5 kg (278 lb 12.8 oz)   SpO2 98%   BMI 43.67 kg/m   Estimated body mass index is 43.67 kg/m  as calculated from the following:    Height as of 10/28/19: 1.702 m (5' 7\").    Weight as of this encounter: 126.5 kg (278 lb 12.8 oz).  Medication Reconciliation: complete  Pat Rooney LPN  "

## 2020-12-11 ASSESSMENT — ANXIETY QUESTIONNAIRES: GAD7 TOTAL SCORE: 19

## 2020-12-14 DIAGNOSIS — F41.1 GAD (GENERALIZED ANXIETY DISORDER): ICD-10-CM

## 2020-12-14 NOTE — TELEPHONE ENCOUNTER
Lopressor      Last Written Prescription Date:  10/28/19  Last Fill Quantity: 180,   # refills: 1  Last Office Visit: 12/10/20  Future Office visit:    Next 5 appointments (look out 90 days)    Jan 04, 2021  2:00 PM  (Arrive by 1:45 PM)  PHYSICAL with SANDER Beltran  [facility] [place] ([facility] [place] ) [address]  [place] [address]  [phone]

## 2020-12-15 RX ORDER — METOPROLOL TARTRATE 25 MG/1
TABLET, FILM COATED ORAL
Qty: 180 TABLET | Refills: 1 | Status: SHIPPED | OUTPATIENT
Start: 2020-12-15 | End: 2021-01-20

## 2020-12-20 ENCOUNTER — HEALTH MAINTENANCE LETTER (OUTPATIENT)
Age: 20
End: 2020-12-20

## 2021-01-18 NOTE — PROGRESS NOTES
SUBJECTIVE:   CC: Mayra Del Cid is an 20 year old woman who presents for preventive health visit.       Patient has been advised of split billing requirements and indicates understanding: Yes  Healthy Habits:  Answers for HPI/ROS submitted by the patient on 2021   Annual Exam:  Frequency of exercise:: 2-3 days/week  Getting at least 3 servings of Calcium per day:: NO  Diet:: Regular (no restrictions)  Taking medications regularly:: Yes  Medication side effects:: None  Bi-annual eye exam:: Yes  Dental care twice a year:: NO  Sleep apnea or symptoms of sleep apnea:: None  Additional concerns today:: No  Duration of exercise:: 30-45 minutes      Depression and Anxiety Follow-Up    How are you doing with your depression since your last visit? No change    How are you doing with your anxiety since your last visit?  Improved     Are you having other symptoms that might be associated with depression or anxiety? No    Have you had a significant life event? No     Do you have any concerns with your use of alcohol or other drugs? No    Social History     Tobacco Use     Smoking status: Former Smoker     Packs/day: 0.25     Years: 3.00     Pack years: 0.75     Types: Cigarettes, Cigars, Other     Start date: 2/15/2016     Quit date: 2019     Years since quittin.7     Smokeless tobacco: Never Used   Substance Use Topics     Alcohol use: Yes     Comment: occ     Drug use: No     PHQ 10/28/2019 2020 12/10/2020   PHQ-9 Total Score 14 12 11   Q9: Thoughts of better off dead/self-harm past 2 weeks Not at all Not at all Not at all   F/U: Thoughts of suicide or self-harm - - -   F/U: Safety concerns - - -     VIPUL-7 SCORE 10/28/2019 2020 12/10/2020   Total Score 10 12 19     Last PHQ-9 12/10/2020   1.  Little interest or pleasure in doing things 1   2.  Feeling down, depressed, or hopeless 1   3.  Trouble falling or staying asleep, or sleeping too much 2   4.  Feeling tired or having little energy 2   5.   Poor appetite or overeating 1   6.  Feeling bad about yourself 1   7.  Trouble concentrating 3   8.  Moving slowly or restless 0   Q9: Thoughts of better off dead/self-harm past 2 weeks 0   PHQ-9 Total Score 11   Difficulty at work, home, or with people -   In the past two weeks have you had thoughts of suicide or self harm? -   Do you have concerns about your personal safety or the safety of others? -     VIPUL-7  12/10/2020   1. Feeling nervous, anxious, or on edge 3   2. Not being able to stop or control worrying 3   3. Worrying too much about different things 3   4. Trouble relaxing 3   5. Being so restless that it is hard to sit still 3   6. Becoming easily annoyed or irritable 3   7. Feeling afraid, as if something awful might happen 1   VIPUL-7 Total Score 19   If you checked any problems, how difficult have they made it for you to do your work, take care of things at home, or get along with other people? -       Suicide Assessment Five-step Evaluation and Treatment (SAFE-T)      Today's PHQ-2 Score:   PHQ-2 (  [employer]) 2021 12/10/2020   Q1: Little interest or pleasure in doing things 1 1   Q2: Feeling down, depressed or hopeless 0 1   PHQ-2 Score 1 2   Q1: Little interest or pleasure in doing things Several days -   Q2: Feeling down, depressed or hopeless Not at all -   PHQ-2 Score 1 -             Social History     Tobacco Use     Smoking status: Former Smoker     Packs/day: 0.25     Years: 3.00     Pack years: 0.75     Types: Cigarettes, Cigars, Other     Start date: 2/15/2016     Quit date: 2019     Years since quittin.7     Smokeless tobacco: Never Used   Substance Use Topics     Alcohol use: Yes     Comment: occ     If you drink alcohol do you typically have >3 drinks per day or >7 drinks per week? No                     Reviewed orders with patient.  Reviewed health maintenance and updated orders accordingly - Yes  Lab work is in process  Labs reviewed in EPIC  BP Readings from Last 3  Encounters:   21 118/70   12/10/20 112/64   10/28/19 122/74    Wt Readings from Last 3 Encounters:   21 122.5 kg (270 lb)   12/10/20 126.5 kg (278 lb 12.8 oz)   10/28/19 123.4 kg (272 lb)                  Patient Active Problem List   Diagnosis     Generalized headache     Pain in joint, shoulder region     Somatic dysfunction of cervical region     Disorder of bursae and tendons in shoulder region     Sprain and strain of hip and thigh     Pain in joint, pelvic region and thigh     Enthesopathy of hip region     ACP (advance care planning)     Moderate single current episode of major depressive disorder (H)     VIPUL (generalized anxiety disorder)     Morbid obesity (H)     Past Surgical History:   Procedure Laterality Date     ORTHOPEDIC SURGERY      right labrum surgery        Social History     Tobacco Use     Smoking status: Former Smoker     Packs/day: 0.25     Years: 3.00     Pack years: 0.75     Types: Cigarettes, Cigars, Other     Start date: 2/15/2016     Quit date: 2019     Years since quittin.7     Smokeless tobacco: Never Used   Substance Use Topics     Alcohol use: Yes     Comment: occ     Family History   Problem Relation Age of Onset     Hypertension Mother      Depression Mother      Anxiety Disorder Mother      Asthma Mother      Depression Father      Substance Abuse Father      Diabetes Maternal Grandmother      Heart Disease Maternal Grandmother      Thyroid Disease Maternal Grandmother      Depression Maternal Grandmother      Anxiety Disorder Maternal Grandmother      Asthma Maternal Grandmother      Diabetes Maternal Grandfather      Heart Disease Maternal Grandfather      Chronic Obstructive Pulmonary Disease Paternal Grandmother          Current Outpatient Medications   Medication Sig Dispense Refill     busPIRone (BUSPAR) 15 MG tablet Take 1 tablet (15 mg) by mouth 2 times daily 60 tablet 3     desvenlafaxine (PRISTIQ) 50 MG 24 hr tablet TAKE 1 TABLET (50 MG) BY MOUTH  DAILY 30 tablet 3     IBUPROFEN PO Take 600 mg by mouth every 8 hours as needed for moderate pain       LORazepam (ATIVAN) 0.5 MG tablet TAKE 1 TABLET (0.5 MG) BY MOUTH EVERY 6 HOURS AS NEEDED FOR ANXIETY 30 tablet 1     metoprolol tartrate (LOPRESSOR) 25 MG tablet TAKE 1 TABLET BY MOUTH TWICE A  tablet 1     zolpidem (AMBIEN) 5 MG tablet TAKE 1 TABLET BY MOUTH AT BEDTIME AS NEEDED FOR SLEEP 30 tablet 0     No Known Allergies  Recent Labs   Lab Test 07/17/17  1713 07/27/16  0900   TSH 0.91 1.13        Mammogram not appropriate for this patient based on age.    Pertinent mammograms are reviewed under the imaging tab.  History of abnormal Pap smear: Last 3 Pap Results: No results found for: PAP     Reviewed and updated as needed this visit by clinical staff  Tobacco  Allergies  Meds   Med Hx  Surg Hx  Fam Hx  Soc Hx        Reviewed and updated as needed this visit by Provider                  Past Medical History:   Diagnosis Date     Depressive disorder 7/17/17     Generalized headache      Shoulder injury 08/2014    Right shoulder pain       Past Surgical History:   Procedure Laterality Date     ORTHOPEDIC SURGERY      right labrum surgery      OB History   No obstetric history on file.       ROS:  CONSTITUTIONAL: NEGATIVE for fever, chills, change in weight  INTEGUMENTARU/SKIN: NEGATIVE for worrisome rashes, moles or lesions  EYES: NEGATIVE for vision changes or irritation  ENT: NEGATIVE for ear, mouth and throat problems  RESP: NEGATIVE for significant cough or SOB  BREAST: NEGATIVE for masses, tenderness or discharge  CV: NEGATIVE for chest pain, palpitations or peripheral edema  GI: NEGATIVE for nausea, abdominal pain, heartburn, or change in bowel habits  : NEGATIVE for unusual urinary or vaginal symptoms. Periods are regular.  MUSCULOSKELETAL: NEGATIVE for significant arthralgias or myalgia  NEURO: NEGATIVE for weakness, dizziness or paresthesias  PSYCHIATRIC: NEGATIVE for changes in mood or  "affect    OBJECTIVE:   /70 (BP Location: Left arm, Patient Position: Chair, Cuff Size: Adult Large)   Pulse 73   Temp 97.8  F (36.6  C) (Tympanic)   Wt 122.5 kg (270 lb)   SpO2 98%   BMI 42.29 kg/m    EXAM:  GENERAL: healthy, alert and no distress  EYES: Eyes grossly normal to inspection, PERRL and conjunctivae and sclerae normal  HENT: ear canals and TM's normal, nose and mouth without ulcers or lesions  NECK: no adenopathy, no asymmetry, masses, or scars and thyroid normal to palpation  RESP: lungs clear to auscultation - no rales, rhonchi or wheezes  CV: regular rate and rhythm, normal S1 S2, no S3 or S4, no murmur, click or rub, no peripheral edema and peripheral pulses strong  ABDOMEN: soft, nontender, no hepatosplenomegaly, no masses and bowel sounds normal   (female): normal female external genitalia, normal urethral meatus, vaginal mucosa, normal cervix/adnexa/uterus without masses or discharge  MS: no gross musculoskeletal defects noted, no edema    Diagnostic Test Results:  Labs reviewed in Epic  No results found for this or any previous visit (from the past 24 hour(s)).    ASSESSMENT/PLAN:   1. Routine general medical examination at a health care facility  Will do std screening and pap smear today. She has been on copper IUD now for 2 years. Never had a pap smear today. Back in school at [employer]. Going to then move to [place].       Patient has been advised of split billing requirements and indicates understanding: Yes  COUNSELING:   Reviewed preventive health counseling, as reflected in patient instructions       Regular exercise       Immunizations    Declined:  due to Other currently getting Covid Vaccines now. Had first one yesterday.                Advance Care Planning    Estimated body mass index is 42.29 kg/m  as calculated from the following:    Height as of 10/28/19: 1.702 m (5' 7\").    Weight as of this encounter: 122.5 kg (270 lb).    Weight management plan: Discussed healthy diet " and exercise guidelines    She reports that she quit smoking about 21 months ago. Her smoking use included cigarettes, cigars, and other. She started smoking about 4 years ago. She has a 0.75 pack-year smoking history. She has never used smokeless tobacco.      Counseling Resources:  ATP IV Guidelines  Pooled Cohorts Equation Calculator  Breast Cancer Risk Calculator  BRCA-Related Cancer Risk Assessment: FHS-7 Tool  FRAX Risk Assessment  ICSI Preventive Guidelines  Dietary Guidelines for Americans, 2010  USDA's MyPlate  ASA Prophylaxis  Lung CA Screening    SANDER Grace  [facility]

## 2021-01-20 ENCOUNTER — OFFICE VISIT (OUTPATIENT)
Dept: FAMILY MEDICINE | Facility: OTHER | Age: 21
End: 2021-01-20
Attending: PHYSICIAN ASSISTANT
Payer: COMMERCIAL

## 2021-01-20 VITALS
BODY MASS INDEX: 42.29 KG/M2 | WEIGHT: 270 LBS | HEART RATE: 73 BPM | OXYGEN SATURATION: 98 % | DIASTOLIC BLOOD PRESSURE: 70 MMHG | SYSTOLIC BLOOD PRESSURE: 118 MMHG | TEMPERATURE: 97.8 F

## 2021-01-20 DIAGNOSIS — F34.1 DYSTHYMIA: ICD-10-CM

## 2021-01-20 DIAGNOSIS — F41.1 GAD (GENERALIZED ANXIETY DISORDER): ICD-10-CM

## 2021-01-20 DIAGNOSIS — Z00.00 ROUTINE GENERAL MEDICAL EXAMINATION AT A HEALTH CARE FACILITY: Primary | ICD-10-CM

## 2021-01-20 PROCEDURE — 99395 PREV VISIT EST AGE 18-39: CPT | Performed by: PHYSICIAN ASSISTANT

## 2021-01-20 PROCEDURE — G0123 SCREEN CERV/VAG THIN LAYER: HCPCS | Performed by: PHYSICIAN ASSISTANT

## 2021-01-20 RX ORDER — METOPROLOL TARTRATE 25 MG/1
TABLET, FILM COATED ORAL
Qty: 180 TABLET | Refills: 3 | Status: SHIPPED | OUTPATIENT
Start: 2021-01-20

## 2021-01-20 RX ORDER — BUSPIRONE HYDROCHLORIDE 15 MG/1
15 TABLET ORAL 3 TIMES DAILY
Qty: 270 TABLET | Refills: 3 | Status: SHIPPED | OUTPATIENT
Start: 2021-01-20

## 2021-01-20 RX ORDER — DESVENLAFAXINE 50 MG/1
50 TABLET, FILM COATED, EXTENDED RELEASE ORAL DAILY
Qty: 90 TABLET | Refills: 3 | Status: SHIPPED | OUTPATIENT
Start: 2021-01-20 | End: 2022-05-09

## 2021-01-20 ASSESSMENT — PAIN SCALES - GENERAL: PAINLEVEL: NO PAIN (0)

## 2021-01-20 NOTE — NURSING NOTE
"Chief Complaint   Patient presents with     Physical       Initial /70 (BP Location: Left arm, Patient Position: Chair, Cuff Size: Adult Large)   Pulse 73   Temp 97.8  F (36.6  C) (Tympanic)   Wt 122.5 kg (270 lb)   SpO2 98%   BMI 42.29 kg/m   Estimated body mass index is 42.29 kg/m  as calculated from the following:    Height as of 10/28/19: 1.702 m (5' 7\").    Weight as of this encounter: 122.5 kg (270 lb).  Medication Reconciliation: complete  Favio Rehman LPN  "

## 2021-01-22 LAB
COPATH REPORT: NORMAL
PAP: NORMAL

## 2021-10-03 ENCOUNTER — HEALTH MAINTENANCE LETTER (OUTPATIENT)
Age: 21
End: 2021-10-03

## 2022-03-19 ENCOUNTER — HEALTH MAINTENANCE LETTER (OUTPATIENT)
Age: 22
End: 2022-03-19

## 2022-05-09 DIAGNOSIS — F34.1 DYSTHYMIA: ICD-10-CM

## 2022-05-09 RX ORDER — DESVENLAFAXINE 50 MG/1
TABLET, FILM COATED, EXTENDED RELEASE ORAL
Qty: 90 TABLET | Refills: 3 | Status: SHIPPED | OUTPATIENT
Start: 2022-05-09

## 2022-05-09 NOTE — TELEPHONE ENCOUNTER
Pristiq       Last Written Prescription Date:  1/20/21  Last Fill Quantity: 90,   # refills: 3  Last Office Visit: 1/20/21  Future Office visit:

## 2022-09-04 ENCOUNTER — HEALTH MAINTENANCE LETTER (OUTPATIENT)
Age: 22
End: 2022-09-04

## 2023-04-29 ENCOUNTER — HEALTH MAINTENANCE LETTER (OUTPATIENT)
Age: 23
End: 2023-04-29